# Patient Record
Sex: MALE | Race: WHITE
[De-identification: names, ages, dates, MRNs, and addresses within clinical notes are randomized per-mention and may not be internally consistent; named-entity substitution may affect disease eponyms.]

---

## 2017-03-16 ENCOUNTER — HOSPITAL ENCOUNTER (INPATIENT)
Dept: HOSPITAL 61 - 4 NORTH | Age: 72
LOS: 5 days | Discharge: HOME HEALTH | DRG: 580 | End: 2017-03-21
Attending: FAMILY MEDICINE | Admitting: FAMILY MEDICINE
Payer: MEDICARE

## 2017-03-16 VITALS — WEIGHT: 302.13 LBS | HEIGHT: 70 IN | BODY MASS INDEX: 43.25 KG/M2

## 2017-03-16 VITALS — DIASTOLIC BLOOD PRESSURE: 81 MMHG | SYSTOLIC BLOOD PRESSURE: 152 MMHG

## 2017-03-16 VITALS — DIASTOLIC BLOOD PRESSURE: 63 MMHG | SYSTOLIC BLOOD PRESSURE: 121 MMHG

## 2017-03-16 VITALS — DIASTOLIC BLOOD PRESSURE: 71 MMHG | SYSTOLIC BLOOD PRESSURE: 113 MMHG

## 2017-03-16 VITALS — SYSTOLIC BLOOD PRESSURE: 125 MMHG | DIASTOLIC BLOOD PRESSURE: 52 MMHG

## 2017-03-16 VITALS — SYSTOLIC BLOOD PRESSURE: 123 MMHG | DIASTOLIC BLOOD PRESSURE: 70 MMHG

## 2017-03-16 VITALS — DIASTOLIC BLOOD PRESSURE: 88 MMHG | SYSTOLIC BLOOD PRESSURE: 158 MMHG

## 2017-03-16 VITALS — DIASTOLIC BLOOD PRESSURE: 52 MMHG | SYSTOLIC BLOOD PRESSURE: 124 MMHG

## 2017-03-16 VITALS — SYSTOLIC BLOOD PRESSURE: 153 MMHG | DIASTOLIC BLOOD PRESSURE: 88 MMHG

## 2017-03-16 DIAGNOSIS — E11.9: ICD-10-CM

## 2017-03-16 DIAGNOSIS — E66.01: ICD-10-CM

## 2017-03-16 DIAGNOSIS — I10: ICD-10-CM

## 2017-03-16 DIAGNOSIS — L02.415: Primary | ICD-10-CM

## 2017-03-16 DIAGNOSIS — B95.62: ICD-10-CM

## 2017-03-16 LAB
ANION GAP SERPL CALC-SCNC: 8 MMOL/L (ref 6–14)
BASOPHILS # BLD AUTO: 0.1 X10^3/UL (ref 0–0.2)
BASOPHILS NFR BLD: 1 % (ref 0–3)
BUN SERPL-MCNC: 24 MG/DL (ref 8–26)
CALCIUM SERPL-MCNC: 9.2 MG/DL (ref 8.5–10.1)
CHLORIDE SERPL-SCNC: 106 MMOL/L (ref 98–107)
CO2 SERPL-SCNC: 30 MMOL/L (ref 21–32)
CREAT SERPL-MCNC: 1.2 MG/DL (ref 0.7–1.3)
EOSINOPHIL NFR BLD: 5 % (ref 0–3)
ERYTHROCYTE [DISTWIDTH] IN BLOOD BY AUTOMATED COUNT: 13.7 % (ref 11.5–14.5)
GFR SERPLBLD BASED ON 1.73 SQ M-ARVRAT: 59.9 ML/MIN
GLUCOSE SERPL-MCNC: 143 MG/DL (ref 70–99)
HCT VFR BLD CALC: 39.1 % (ref 39–53)
HGB BLD-MCNC: 12.8 G/DL (ref 13–17.5)
LYMPHOCYTES # BLD: 1.6 X10^3/UL (ref 1–4.8)
LYMPHOCYTES NFR BLD AUTO: 16 % (ref 24–48)
MCH RBC QN AUTO: 30 PG (ref 25–35)
MCHC RBC AUTO-ENTMCNC: 33 G/DL (ref 31–37)
MCV RBC AUTO: 91 FL (ref 79–100)
MONOCYTES NFR BLD: 6 % (ref 0–9)
NEUTROPHILS NFR BLD AUTO: 72 % (ref 31–73)
PLATELET # BLD AUTO: 269 X10^3/UL (ref 140–400)
POTASSIUM SERPL-SCNC: 3.9 MMOL/L (ref 3.5–5.1)
RBC # BLD AUTO: 4.32 X10^6/UL (ref 4.3–5.7)
SODIUM SERPL-SCNC: 144 MMOL/L (ref 136–145)
WBC # BLD AUTO: 10.2 X10^3/UL (ref 4–11)

## 2017-03-16 PROCEDURE — 36415 COLL VENOUS BLD VENIPUNCTURE: CPT

## 2017-03-16 PROCEDURE — 82947 ASSAY GLUCOSE BLOOD QUANT: CPT

## 2017-03-16 PROCEDURE — 87205 SMEAR GRAM STAIN: CPT

## 2017-03-16 PROCEDURE — 87040 BLOOD CULTURE FOR BACTERIA: CPT

## 2017-03-16 PROCEDURE — 0KBQ0ZZ EXCISION OF RIGHT UPPER LEG MUSCLE, OPEN APPROACH: ICD-10-PCS | Performed by: SURGERY

## 2017-03-16 PROCEDURE — 0HBHXZX EXCISION OF RIGHT UPPER LEG SKIN, EXTERNAL APPROACH, DIAGNOSTIC: ICD-10-PCS | Performed by: SURGERY

## 2017-03-16 PROCEDURE — 80202 ASSAY OF VANCOMYCIN: CPT

## 2017-03-16 PROCEDURE — 90715 TDAP VACCINE 7 YRS/> IM: CPT

## 2017-03-16 PROCEDURE — 80048 BASIC METABOLIC PNL TOTAL CA: CPT

## 2017-03-16 PROCEDURE — 87071 CULTURE AEROBIC QUANT OTHER: CPT

## 2017-03-16 PROCEDURE — 88304 TISSUE EXAM BY PATHOLOGIST: CPT

## 2017-03-16 PROCEDURE — 85027 COMPLETE CBC AUTOMATED: CPT

## 2017-03-16 PROCEDURE — 87075 CULTR BACTERIA EXCEPT BLOOD: CPT

## 2017-03-16 RX ADMIN — AMLODIPINE BESYLATE SCH MG: 5 TABLET ORAL at 16:00

## 2017-03-16 RX ADMIN — METFORMIN HYDROCHLORIDE SCH MG: 500 TABLET, FILM COATED ORAL at 20:03

## 2017-03-16 RX ADMIN — DEXTROSE, SODIUM CHLORIDE, SODIUM LACTATE, POTASSIUM CHLORIDE, AND CALCIUM CHLORIDE SCH MLS/HR: 5; .6; .31; .03; .02 INJECTION, SOLUTION INTRAVENOUS at 20:14

## 2017-03-16 RX ADMIN — LOSARTAN POTASSIUM SCH MG: 50 TABLET, FILM COATED ORAL at 16:00

## 2017-03-16 RX ADMIN — OXYCODONE HYDROCHLORIDE AND ACETAMINOPHEN SCH MG: 500 TABLET ORAL at 16:00

## 2017-03-16 RX ADMIN — HYDROCHLOROTHIAZIDE SCH MG: 25 TABLET ORAL at 16:00

## 2017-03-16 RX ADMIN — Medication SCH MCG: at 16:00

## 2017-03-16 RX ADMIN — VANCOMYCIN HYDROCHLORIDE PRN EACH: 1 INJECTION, POWDER, LYOPHILIZED, FOR SOLUTION INTRAVENOUS at 20:13

## 2017-03-16 RX ADMIN — ALLOPURINOL SCH MG: 300 TABLET ORAL at 16:00

## 2017-03-16 RX ADMIN — VANCOMYCIN HYDROCHLORIDE PRN EACH: 1 INJECTION, POWDER, LYOPHILIZED, FOR SOLUTION INTRAVENOUS at 17:48

## 2017-03-16 NOTE — PDOC
BRIEF OPERATIVE NOTE


Date:  Mar 16, 2017


Pre-Op Diagnosis


right thigh abscess


Post-Op Diagnosis


same


Procedure Performed


sharp debridement of skin, and subcutaneous tissue, down to muscle, skin biopsy


Surgeon


Den


Anesthesia Type:  General


Blood Loss


50cc


IV Fluid


500cc


Specimens Obtained


cultures, skin biopsy


Findings


abscess


Complications


none








DAT CENTENO MD Mar 16, 2017 18:16

## 2017-03-16 NOTE — HP
ADMIT DATE:  03/16/2017



CHIEF COMPLAINT:  Groin abscess.



HISTORY OF PRESENT ILLNESS:  A 71-year-old white male with known mild diabetes

and hypertension, developed pain, swelling and redness in the right upper thigh

area about 1 week prior to admission.  He was seen on 03/07/2017 with minor pain

in the area and told to follow up p.r.n.  He was seen again on 03/13/2017 with

increasing pain and was placed on doxycycline with cephalexin, but the pain has

increased and swelling has increased with small amount of drainage in the medial

area of the thigh and some decrease in the pain and pressure area.  The area of

induration and abscess was too large for outpatient local anesthetic incision

and drainage and he is admitted for treatment of the upper thigh abscess.



PAST HISTORY: 

ALLERGIES:  No drugs.



HOME MEDICATIONS:  Include metformin for diabetes with good control,

allopurinol, amlodipine, losartan, and pravastatin.



He has had no significant surgeries in the past.



SOCIAL HISTORY:  Nonsmoker, nondrinker, , retired, physically active.



FAMILY HISTORY:  Unremarkable.



REVIEW OF SYSTEMS:  No other specific complaints.  Diabetic control has been

good.  The most recent hemoglobin A1c being 7.1 and chemistry profile being

unremarkable as well.



OBJECTIVE:

ENT:  All within normal limits.

NECK:  No masses, nodes or bruits.

LUNGS:  Clear.

CARDIOVASCULAR:  Regular rate.  No tachycardia or murmur.

ABDOMEN:  Obese, soft, benign and nontender.

EXTREMITIES:  There is a large indurated, mildly fluctuant area of abscess in

the upper anterior medial right thigh just distal and inferior to the right

inguinal fold.  There is a scant amount of minimal drainage from pinpoint area

of the medial thigh and has small fluctuance present.  No adenopathy is noted

and no other joint or skin lesions are noted.  He has good distal pulses.

NEUROLOGIC:  Physiologic, nonfocal.

GENITOURINARY AND RECTAL:  Deferred.



ASSESSMENT:  Right proximal thigh abscess and otherwise well controlled

diabetic, hypertensive.



PLAN:  Continue IV vancomycin and home meds with surgical consultation for

incision and drainage under either general or epidural anesthesia because of the

extent of the wound.

 



______________________________

AMINAH THAKUR MD



DR:  MAI/kaity  JOB#:  [id number] / [id number]

DD:  03/16/2017 14:02  DT:  03/16/2017 14:33

## 2017-03-17 VITALS — SYSTOLIC BLOOD PRESSURE: 142 MMHG | DIASTOLIC BLOOD PRESSURE: 67 MMHG

## 2017-03-17 VITALS — SYSTOLIC BLOOD PRESSURE: 154 MMHG | DIASTOLIC BLOOD PRESSURE: 80 MMHG

## 2017-03-17 VITALS — SYSTOLIC BLOOD PRESSURE: 115 MMHG | DIASTOLIC BLOOD PRESSURE: 76 MMHG

## 2017-03-17 VITALS — SYSTOLIC BLOOD PRESSURE: 140 MMHG | DIASTOLIC BLOOD PRESSURE: 77 MMHG

## 2017-03-17 VITALS — DIASTOLIC BLOOD PRESSURE: 65 MMHG | SYSTOLIC BLOOD PRESSURE: 143 MMHG

## 2017-03-17 VITALS — DIASTOLIC BLOOD PRESSURE: 71 MMHG | SYSTOLIC BLOOD PRESSURE: 126 MMHG

## 2017-03-17 RX ADMIN — METFORMIN HYDROCHLORIDE SCH MG: 500 TABLET, FILM COATED ORAL at 17:29

## 2017-03-17 RX ADMIN — DEXTROSE, SODIUM CHLORIDE, SODIUM LACTATE, POTASSIUM CHLORIDE, AND CALCIUM CHLORIDE SCH MLS/HR: 5; .6; .31; .03; .02 INJECTION, SOLUTION INTRAVENOUS at 08:00

## 2017-03-17 RX ADMIN — Medication SCH MCG: at 08:26

## 2017-03-17 RX ADMIN — METFORMIN HYDROCHLORIDE SCH MG: 500 TABLET, FILM COATED ORAL at 08:25

## 2017-03-17 RX ADMIN — DEXTROSE, SODIUM CHLORIDE, SODIUM LACTATE, POTASSIUM CHLORIDE, AND CALCIUM CHLORIDE SCH MLS/HR: 5; .6; .31; .03; .02 INJECTION, SOLUTION INTRAVENOUS at 04:11

## 2017-03-17 RX ADMIN — ALLOPURINOL SCH MG: 300 TABLET ORAL at 08:26

## 2017-03-17 RX ADMIN — AMLODIPINE BESYLATE SCH MG: 5 TABLET ORAL at 08:26

## 2017-03-17 RX ADMIN — LOSARTAN POTASSIUM SCH MG: 50 TABLET, FILM COATED ORAL at 08:25

## 2017-03-17 RX ADMIN — OXYCODONE HYDROCHLORIDE AND ACETAMINOPHEN SCH MG: 500 TABLET ORAL at 08:24

## 2017-03-17 RX ADMIN — VANCOMYCIN HYDROCHLORIDE SCH MLS/HR: 1 INJECTION, POWDER, FOR SOLUTION INTRAVENOUS at 05:23

## 2017-03-17 RX ADMIN — ATORVASTATIN CALCIUM SCH MG: 10 TABLET, FILM COATED ORAL at 08:30

## 2017-03-17 RX ADMIN — HYDROCHLOROTHIAZIDE SCH MG: 25 TABLET ORAL at 08:25

## 2017-03-17 RX ADMIN — VANCOMYCIN HYDROCHLORIDE SCH MLS/HR: 1 INJECTION, POWDER, FOR SOLUTION INTRAVENOUS at 17:30

## 2017-03-17 NOTE — PDOC
Provider Note


Provider Note


vss, no temp- labs ok- cult pending , was on doxy/keflex prior to surg- can dc 

when dr jovan sullivan w/ what wound care needs to be done








AMINAH THAKUR MD Mar 17, 2017 08:15

## 2017-03-17 NOTE — OP
DATE OF SURGERY:  03/16/2017



PREOPERATIVE DIAGNOSIS:  Right thigh abscess.



POSTOPERATIVE DIAGNOSIS:  Right thigh abscess.



PROCEDURE:  Sharp debridement on the skin and subcutaneous tissue down to

muscle, skin biopsy.



SURGEON:  Dat Centeno M.D.



ANESTHESIA:  General.



ESTIMATED BLOOD LOSS:  50 mL.



IV FLUIDS:  500 mL.



INDICATIONS:  The patient is a morbidly obese male who presents with pain,

erythema, induration and drainage from the upper medial aspect of his right

thigh.  He is brought for debridement.



DESCRIPTION OF PROCEDURE:  The patient brought to the operating suite, given a

general anesthetic and placed in dorsal lithotomy position and the right thigh,

scrotum, and the perineal area were prepped and draped in usual sterile fashion.

 A 16 gauge needle was used as a seeker to identify the pus filled abscess

cavity and then an incision was made and the cavity evacuated with suction. 

Cultures were made.  A skin biopsy was harvested.  The incision was extended

along the entirety of the abscess cavity to allow adequate debridement.  Skin

and subcutaneous tissue was sharply removed down to the muscle.



The wound was then irrigated with a pulse .  Hemostasis obtained with

3-0 Vicryl stick tie and cautery.  Once hemostasis was obtained, the wound was

dressed with a piece of Surgicel and vaginal packing soaked in quarter percent

____ solution.  Sterile dressing applied.  The patient taken out of the

lithotomy position, awakened from his anesthetic and taken to the recovery room

in satisfactory condition.

 



______________________________

DAT CENTENO MD



DR:  LEV/kaity  JOB#:  [id number] / [id number]

DD:  03/17/2017 13:36  DT:  03/17/2017 17:53

## 2017-03-17 NOTE — PDOC
JOSE DEL RIO APRN 3/17/17 1006:


SURGICAL PROGRESS NOTE


Subjective


feeling better


Vital Signs





 Vital Signs








  Date Time  Temp Pulse Resp B/P Pulse Ox O2 Delivery O2 Flow Rate FiO2


 


3/17/17 08:26  87  115/76    


 


3/17/17 07:10      Room Air  


 


3/17/17 07:00 98.2  20  95   





 98.2       


 


3/16/17 18:28       10.0 








I&O











 Intake and Output 


 


 3/17/17





 07:00


 


Intake Total 3690 ml


 


Output Total 50 ml


 


Balance 3640 ml


 


 


 


Intake Oral 240 ml


 


IV Total 2325 ml


 


Other 1125 ml


 


Output Estimated Blood Loss 50 ml


 


# Voids 2








General:  Alert, Oriented X3, Cooperative, No acute distress


Extremities:  Other (right groin wound clean, packed )


Labs





Laboratory Tests








Test


  3/16/17


15:45 3/16/17


18:16 3/16/17


20:43 3/17/17


08:05


 


White Blood Count


  10.2x10^3/uL


(4.0-11.0) 


  


  


 


 


Red Blood Count


  4.32x10^6/uL


(4.30-5.70) 


  


  


 


 


Hemoglobin


  12.8g/dL


(13.0-17.5) 


  


  


 


 


Hematocrit


  39.1%


(39.0-53.0) 


  


  


 


 


Mean Corpuscular Volume 91fL ()    


 


Mean Corpuscular Hemoglobin 30pg (25-35)    


 


Mean Corpuscular Hemoglobin


Concent 33g/dL (31-37) 


  


  


  


 


 


Red Cell Distribution Width


  13.7%


(11.5-14.5) 


  


  


 


 


Platelet Count


  269x10^3/uL


(140-400) 


  


  


 


 


Neutrophils (%) (Auto) 72% (31-73)    


 


Lymphocytes (%) (Auto) 16% (24-48)    


 


Monocytes (%) (Auto) 6% (0-9)    


 


Eosinophils (%) (Auto) 5% (0-3)    


 


Basophils (%) (Auto) 1% (0-3)    


 


Neutrophils # (Auto)


  7.4x10^3uL


(1.8-7.7) 


  


  


 


 


Lymphocytes # (Auto)


  1.6x10^3/uL


(1.0-4.8) 


  


  


 


 


Monocytes # (Auto)


  0.6x10^3/uL


(0.0-1.1) 


  


  


 


 


Eosinophils # (Auto)


  0.5x10^3/uL


(0.0-0.7) 


  


  


 


 


Basophils # (Auto)


  0.1x10^3/uL


(0.0-0.2) 


  


  


 


 


Sodium Level


  144mmol/L


(136-145) 


  


  


 


 


Potassium Level


  3.9mmol/L


(3.5-5.1) 


  


  


 


 


Chloride Level


  106mmol/L


() 


  


  


 


 


Carbon Dioxide Level


  30mmol/L


(21-32) 


  


  


 


 


Anion Gap 8 (6-14)    


 


Blood Urea Nitrogen 24mg/dL (8-26)    


 


Creatinine


  1.2mg/dL


(0.7-1.3) 


  


  


 


 


Estimated GFR


(Cockcroft-Gault) 59.9 


  


  


  


 


 


Glucose Level


  143mg/dL


(70-99) 


  


  


 


 


Calcium Level


  9.2mg/dL


(8.5-10.1) 


  


  


 


 


Glucose (Fingerstick)


  


  119mg/dL


(70-99) 163mg/dL


(70-99) 133mg/dL


(70-99)








Laboratory Tests








Test


  3/16/17


15:45 3/16/17


18:16 3/16/17


20:43 3/17/17


08:05


 


White Blood Count


  10.2x10^3/uL


(4.0-11.0) 


  


  


 


 


Red Blood Count


  4.32x10^6/uL


(4.30-5.70) 


  


  


 


 


Hemoglobin


  12.8g/dL


(13.0-17.5) 


  


  


 


 


Hematocrit


  39.1%


(39.0-53.0) 


  


  


 


 


Mean Corpuscular Volume 91fL ()    


 


Mean Corpuscular Hemoglobin 30pg (25-35)    


 


Mean Corpuscular Hemoglobin


Concent 33g/dL (31-37) 


  


  


  


 


 


Red Cell Distribution Width


  13.7%


(11.5-14.5) 


  


  


 


 


Platelet Count


  269x10^3/uL


(140-400) 


  


  


 


 


Neutrophils (%) (Auto) 72% (31-73)    


 


Lymphocytes (%) (Auto) 16% (24-48)    


 


Monocytes (%) (Auto) 6% (0-9)    


 


Eosinophils (%) (Auto) 5% (0-3)    


 


Basophils (%) (Auto) 1% (0-3)    


 


Neutrophils # (Auto)


  7.4x10^3uL


(1.8-7.7) 


  


  


 


 


Lymphocytes # (Auto)


  1.6x10^3/uL


(1.0-4.8) 


  


  


 


 


Monocytes # (Auto)


  0.6x10^3/uL


(0.0-1.1) 


  


  


 


 


Eosinophils # (Auto)


  0.5x10^3/uL


(0.0-0.7) 


  


  


 


 


Basophils # (Auto)


  0.1x10^3/uL


(0.0-0.2) 


  


  


 


 


Sodium Level


  144mmol/L


(136-145) 


  


  


 


 


Potassium Level


  3.9mmol/L


(3.5-5.1) 


  


  


 


 


Chloride Level


  106mmol/L


() 


  


  


 


 


Carbon Dioxide Level


  30mmol/L


(21-32) 


  


  


 


 


Anion Gap 8 (6-14)    


 


Blood Urea Nitrogen 24mg/dL (8-26)    


 


Creatinine


  1.2mg/dL


(0.7-1.3) 


  


  


 


 


Estimated GFR


(Cockcroft-Gault) 59.9 


  


  


  


 


 


Glucose Level


  143mg/dL


(70-99) 


  


  


 


 


Calcium Level


  9.2mg/dL


(8.5-10.1) 


  


  


 


 


Glucose (Fingerstick)


  


  119mg/dL


(70-99) 163mg/dL


(70-99) 133mg/dL


(70-99)








Problem List


s/p debridement right thigh abscess





wound care to see for wound assessment, packing needs daily


Problems:  





DAT CENTENO MD 3/17/17 1338:


SURGICAL PROGRESS NOTE


Assessment/Plan


pt seen and examined


area of erythema outlined yesterday in OR has already improved


continue wound care


home when able to take PO abx


Problems:  








JOSE DEL RIO Mar 17, 2017 10:06


DAT CENTENO MD Mar 17, 2017 13:38

## 2017-03-18 VITALS — SYSTOLIC BLOOD PRESSURE: 149 MMHG | DIASTOLIC BLOOD PRESSURE: 77 MMHG

## 2017-03-18 VITALS — SYSTOLIC BLOOD PRESSURE: 144 MMHG | DIASTOLIC BLOOD PRESSURE: 74 MMHG

## 2017-03-18 VITALS — DIASTOLIC BLOOD PRESSURE: 87 MMHG | SYSTOLIC BLOOD PRESSURE: 169 MMHG

## 2017-03-18 VITALS — DIASTOLIC BLOOD PRESSURE: 72 MMHG | SYSTOLIC BLOOD PRESSURE: 153 MMHG

## 2017-03-18 VITALS — DIASTOLIC BLOOD PRESSURE: 81 MMHG | SYSTOLIC BLOOD PRESSURE: 165 MMHG

## 2017-03-18 VITALS — SYSTOLIC BLOOD PRESSURE: 166 MMHG | DIASTOLIC BLOOD PRESSURE: 79 MMHG

## 2017-03-18 RX ADMIN — METFORMIN HYDROCHLORIDE SCH MG: 500 TABLET, FILM COATED ORAL at 09:23

## 2017-03-18 RX ADMIN — ATORVASTATIN CALCIUM SCH MG: 10 TABLET, FILM COATED ORAL at 09:23

## 2017-03-18 RX ADMIN — Medication SCH MCG: at 09:23

## 2017-03-18 RX ADMIN — LOSARTAN POTASSIUM SCH MG: 50 TABLET, FILM COATED ORAL at 09:22

## 2017-03-18 RX ADMIN — OXYCODONE HYDROCHLORIDE AND ACETAMINOPHEN SCH MG: 500 TABLET ORAL at 09:22

## 2017-03-18 RX ADMIN — VANCOMYCIN HYDROCHLORIDE SCH MLS/HR: 1 INJECTION, POWDER, FOR SOLUTION INTRAVENOUS at 18:11

## 2017-03-18 RX ADMIN — HYDROCHLOROTHIAZIDE SCH MG: 25 TABLET ORAL at 09:22

## 2017-03-18 RX ADMIN — VANCOMYCIN HYDROCHLORIDE PRN EACH: 1 INJECTION, POWDER, LYOPHILIZED, FOR SOLUTION INTRAVENOUS at 06:31

## 2017-03-18 RX ADMIN — ALLOPURINOL SCH MG: 300 TABLET ORAL at 09:23

## 2017-03-18 RX ADMIN — VANCOMYCIN HYDROCHLORIDE SCH MLS/HR: 1 INJECTION, POWDER, FOR SOLUTION INTRAVENOUS at 06:29

## 2017-03-18 RX ADMIN — METFORMIN HYDROCHLORIDE SCH MG: 500 TABLET, FILM COATED ORAL at 18:10

## 2017-03-18 RX ADMIN — AMLODIPINE BESYLATE SCH MG: 5 TABLET ORAL at 09:23

## 2017-03-18 NOTE — PDOC
GENERAL


General:


vss and afebrile. sugars good. bmp and cbc ok at admit.  gram stain with only 

rbc's. surgical notes reviewed. surgery help appreciated. growth of staph 

aureus on culture. await final sensitivities. cont same.


Problems:  





VITAL SIGNS


Vital Signs:





 Vital Signs








  Date Time  Temp Pulse Resp B/P Pulse Ox O2 Delivery O2 Flow Rate FiO2


 


3/18/17 11:00 98.0 86 16 144/74 95 Room Air  





 98.0       











I & O


I & O











 Intake and Output 


 


 3/18/17





 07:00


 


Intake Total 720 ml


 


Balance 720 ml


 


 


 


Intake Oral 720 ml


 


# Voids 2











ALLERGIES


Allergies:





 Allergies








Coded Allergies Type Severity Reaction Last Updated Verified


 


  No Known Drug Allergies    3/16/17 No











MEDS


Medications:





 Current Medications








 Medications


  (Trade)  Dose


 Ordered  Sig/Fanny  Start Time


 Stop Time Status Last Admin


Dose Admin


 


 Allopurinol


  (Zyloprim)  300 mg  DAILY  3/16/17 16:00


    3/18/17 09:23


300 MG


 


 Amlodipine


 Besylate


  (Norvasc)  2.5 mg  DAILY  3/16/17 16:00


    3/18/17 09:23


2.5 MG


 


 Ascorbic Acid


  (Vitamin C)  500 mg  DAILY  3/16/17 16:00


    3/18/17 09:22


500 MG


 


 Atorvastatin


 Calcium


  (Lipitor)  5 mg  DAILY  3/17/17 09:00


    3/18/17 09:23


5 MG


 


 Atorvastatin


 Calcium 5 mg  5 mg  QHS  3/16/17 21:00


 3/16/17 21:16 DC  


 


 


 Cellulose  1 each  STK-MED ONCE  3/16/17 17:54


 3/16/17 17:55 DC 3/16/17 17:42


1 EACH


 


 Cyanocobalamin


  (Vitamin B-12)  1,000 mcg  DAILY  3/16/17 16:00


    3/18/17 09:23


1,000 MCG


 


 Dextrose/Lactated


 Ringer's


  (Iv D5%-Lr)  1,000 ml @ 


 125 mls/hr  Q8H  3/16/17 16:00


 3/17/17 08:12 DC 3/17/17 04:11


125 MLS/HR


 


 Diphenhydramine


 HCl 25 mg  25 mg  1X PACU  PRN  3/16/17 19:00


 3/18/17 13:02 DC 3/16/17 18:52


25 MG


 


 Diphtheria/


 Tetanus/Acell


 Pertussis


  (Boostrix)  0.5 ml  ONCE ONCE  3/16/17 21:00


 3/16/17 21:01 DC 3/16/17 21:11


0.5 ML


 


 Fentanyl Citrate


  (Fentanyl 2ml


 Vial)  100 mcg  STK-MED ONCE  3/16/17 17:04


 3/16/17 17:05 DC  


 


 


 Hydrochlorothiazide


  (Hydrodiuril)  25 mg  DAILY  3/16/17 16:00


    3/18/17 09:22


25 MG


 


 Hydromorphone HCl


  (Dilaudid)  1 mg  PRN Q3HRS  PRN  3/16/17 18:30


    3/17/17 16:23


1 MG


 


 Lactated Ringer's


  (Iv Lactated


 Ringers)  1,000 ml @ 


 30 mls/hr  Q24H  3/16/17 16:18


 3/17/17 04:17 DC 3/16/17 16:56


30 MLS/HR


 


 Lidocaine HCl  100 mg  STK-MED ONCE  3/16/17 18:00


 3/16/17 18:01 DC  


 


 


 Losartan Potassium


  (Cozaar)  100 mg  DAILY  3/16/17 16:00


    3/18/17 09:22


100 MG


 


 Metformin HCl


  (Glucophage)  1,000 mg  BIDWMEALS  3/16/17 17:00


    3/18/17 09:23


1,000 MG


 


 Morphine Sulfate


 1 mg  1 mg  PRN Q10MIN  PRN  3/16/17 16:30


 3/16/17 22:00 DC  


 


 


 Ondansetron HCl


  (Zofran)  4 mg  PRN Q6HRS  PRN  3/16/17 18:30


     


 


 


 Oxycodone/


 Acetaminophen


  (Percocet 10/325)  1 tab  PRN Q4HRS  PRN  3/16/17 18:30


     


 


 


 Prochlorperazine


 Edisylate


  (Compazine)  5 mg  PACU PRN  PRN  3/16/17 16:30


 3/16/17 22:00 DC  


 


 


 Propofol


  (Diprivan)  20 ml @ As


 Directed  STK-MED ONCE  3/16/17 17:04


 3/16/17 17:05 DC  


 


 


 Sevoflurane 30 ml  30 ml  STK-MED ONCE  3/16/17 17:04


 3/16/17 17:05 DC  


 


 


 Sodium


 Hypochlorite


  (Dakin'S 1/4


 Strength)  1 jesus  ONCE  ONCE  3/16/17 18:00


 3/16/17 18:01 DC 3/16/17 17:42


1 JESUS


 


 Vancomycin HCl  1 each  1X  ONCE  3/18/17 05:00


 3/18/17 05:01 DC 3/18/17 05:00


1 EACH


 


 Vancomycin HCl


  (Vanco Per


 Pharmacy)  1 each  PRN DAILY  PRN  3/16/17 15:45


    3/18/17 06:31


1 EACH


 


 Vancomycin HCl/


 Sodium Chloride


  (Iv Sodium


 Chloride 0.9%


 500ml Bag)  500 ml @ 


 250 mls/hr  Q12H  3/17/17 05:30


    3/18/17 06:29


250 MLS/HR











LAB


Lab:





Laboratory Tests








Test


  3/17/17


16:55 3/17/17


20:43 3/18/17


04:45 3/18/17


07:34


 


Glucose (Fingerstick)


  150mg/dL


(70-99) 124mg/dL


(70-99) 


  149mg/dL


(70-99)


 


Vancomycin Level Trough


  


  


  17.7mcg/mL


(10.0-20.0) 


 


 


Vancomycin Last Dose Date   3//17/17  


 


Vancomycin Last Dose Time   1830  














Test


  3/18/17


10:54 


  


  


 


 


Glucose (Fingerstick)


  156mg/dL


(70-99) 


  


  


 














LEIGH ANN UMANA MD Mar 18, 2017 15:46

## 2017-03-18 NOTE — PDOC
Provider Note


Provider Note


POD 2


no c/o


wound vac on


 no new recs








DAT CENTENO MD Mar 18, 2017 10:21

## 2017-03-19 VITALS — SYSTOLIC BLOOD PRESSURE: 158 MMHG | DIASTOLIC BLOOD PRESSURE: 84 MMHG

## 2017-03-19 VITALS — SYSTOLIC BLOOD PRESSURE: 146 MMHG | DIASTOLIC BLOOD PRESSURE: 77 MMHG

## 2017-03-19 VITALS — SYSTOLIC BLOOD PRESSURE: 147 MMHG | DIASTOLIC BLOOD PRESSURE: 89 MMHG

## 2017-03-19 VITALS — SYSTOLIC BLOOD PRESSURE: 152 MMHG | DIASTOLIC BLOOD PRESSURE: 87 MMHG

## 2017-03-19 VITALS — SYSTOLIC BLOOD PRESSURE: 145 MMHG | DIASTOLIC BLOOD PRESSURE: 77 MMHG

## 2017-03-19 VITALS — DIASTOLIC BLOOD PRESSURE: 81 MMHG | SYSTOLIC BLOOD PRESSURE: 163 MMHG

## 2017-03-19 RX ADMIN — METFORMIN HYDROCHLORIDE SCH MG: 500 TABLET, FILM COATED ORAL at 16:52

## 2017-03-19 RX ADMIN — ALLOPURINOL SCH MG: 300 TABLET ORAL at 08:13

## 2017-03-19 RX ADMIN — Medication SCH MCG: at 08:13

## 2017-03-19 RX ADMIN — SULFAMETHOXAZOLE AND TRIMETHOPRIM SCH TAB: 800; 160 TABLET ORAL at 20:32

## 2017-03-19 RX ADMIN — HYDROCHLOROTHIAZIDE SCH MG: 25 TABLET ORAL at 08:13

## 2017-03-19 RX ADMIN — OXYCODONE HYDROCHLORIDE AND ACETAMINOPHEN SCH MG: 500 TABLET ORAL at 08:13

## 2017-03-19 RX ADMIN — METFORMIN HYDROCHLORIDE SCH MG: 500 TABLET, FILM COATED ORAL at 08:13

## 2017-03-19 RX ADMIN — AMLODIPINE BESYLATE SCH MG: 5 TABLET ORAL at 08:14

## 2017-03-19 RX ADMIN — ATORVASTATIN CALCIUM SCH MG: 10 TABLET, FILM COATED ORAL at 08:14

## 2017-03-19 RX ADMIN — VANCOMYCIN HYDROCHLORIDE SCH MLS/HR: 1 INJECTION, POWDER, FOR SOLUTION INTRAVENOUS at 05:11

## 2017-03-19 RX ADMIN — LOSARTAN POTASSIUM SCH MG: 50 TABLET, FILM COATED ORAL at 08:13

## 2017-03-19 NOTE — PDOC
Provider Note


Provider Note


SURG   POD 3





no new c/o


vac in place


continue wound care








DAT CENTENO MD Mar 19, 2017 11:32

## 2017-03-19 NOTE — PDOC
GENERAL


General:


vss and afebrile. leg feeling better other than discomfort of wound vac. mrsa 

on cultures and will transition patient to po bactrim. will need home health 

wound care at [place].


Problems:  





VITAL SIGNS


Vital Signs:





 Vital Signs








  Date Time  Temp Pulse Resp B/P Pulse Ox O2 Delivery O2 Flow Rate FiO2


 


3/19/17 11:00 98.1 83 18 147/89 97 Room Air  





 98.1       











I & O


I & O











 Intake and Output 


 


 3/19/17





 07:00


 


Intake Total 240 ml


 


Balance 240 ml


 


 


 


Intake Oral 240 ml


 


# Voids 3











ALLERGIES


Allergies:





 Allergies








Coded Allergies Type Severity Reaction Last Updated Verified


 


  No Known Drug Allergies    3/16/17 No











MEDS


Medications:





 Current Medications








 Medications


  (Trade)  Dose


 Ordered  Sig/Fanny  Start Time


 Stop Time Status Last Admin


Dose Admin


 


 Allopurinol


  (Zyloprim)  300 mg  DAILY  3/16/17 16:00


    3/19/17 08:13


300 MG


 


 Amlodipine


 Besylate


  (Norvasc)  2.5 mg  DAILY  3/16/17 16:00


    3/19/17 08:14


2.5 MG


 


 Ascorbic Acid


  (Vitamin C)  500 mg  DAILY  3/16/17 16:00


    3/19/17 08:13


500 MG


 


 Atorvastatin


 Calcium


  (Lipitor)  5 mg  DAILY  3/17/17 09:00


    3/19/17 08:14


5 MG


 


 Atorvastatin


 Calcium 5 mg  5 mg  QHS  3/16/17 21:00


 3/16/17 21:16 DC  


 


 


 Cellulose  1 each  STK-MED ONCE  3/16/17 17:54


 3/16/17 17:55 DC 3/16/17 17:42


1 EACH


 


 Cyanocobalamin


  (Vitamin B-12)  1,000 mcg  DAILY  3/16/17 16:00


    3/19/17 08:13


1,000 MCG


 


 Dextrose/Lactated


 Ringer's


  (Iv D5%-Lr)  1,000 ml @ 


 125 mls/hr  Q8H  3/16/17 16:00


 3/17/17 08:12 DC 3/17/17 04:11


125 MLS/HR


 


 Diphenhydramine


 HCl 25 mg  25 mg  1X PACU  PRN  3/16/17 19:00


 3/18/17 13:02 DC 3/16/17 18:52


25 MG


 


 Diphtheria/


 Tetanus/Acell


 Pertussis


  (Boostrix)  0.5 ml  ONCE ONCE  3/16/17 21:00


 3/16/17 21:01 DC 3/16/17 21:11


0.5 ML


 


 Fentanyl Citrate


  (Fentanyl 2ml


 Vial)  100 mcg  STK-MED ONCE  3/16/17 17:04


 3/16/17 17:05 DC  


 


 


 Hydrochlorothiazide


  (Hydrodiuril)  25 mg  DAILY  3/16/17 16:00


    3/19/17 08:13


25 MG


 


 Hydromorphone HCl


  (Dilaudid)  1 mg  PRN Q3HRS  PRN  3/16/17 18:30


    3/17/17 16:23


1 MG


 


 Lactated Ringer's


  (Iv Lactated


 Ringers)  1,000 ml @ 


 30 mls/hr  Q24H  3/16/17 16:18


 3/17/17 04:17 DC 3/16/17 16:56


30 MLS/HR


 


 Lidocaine HCl  100 mg  STK-MED ONCE  3/16/17 18:00


 3/16/17 18:01 DC  


 


 


 Losartan Potassium


  (Cozaar)  100 mg  DAILY  3/16/17 16:00


    3/19/17 08:13


100 MG


 


 Metformin HCl


  (Glucophage)  1,000 mg  BIDWMEALS  3/16/17 17:00


    3/19/17 08:13


1,000 MG


 


 Morphine Sulfate


 1 mg  1 mg  PRN Q10MIN  PRN  3/16/17 16:30


 3/16/17 22:00 DC  


 


 


 Ondansetron HCl


  (Zofran)  4 mg  PRN Q6HRS  PRN  3/16/17 18:30


     


 


 


 Oxycodone/


 Acetaminophen


  (Percocet 10/325)  1 tab  PRN Q4HRS  PRN  3/16/17 18:30


     


 


 


 Prochlorperazine


 Edisylate


  (Compazine)  5 mg  PACU PRN  PRN  3/16/17 16:30


 3/16/17 22:00 DC  


 


 


 Propofol


  (Diprivan)  20 ml @ As


 Directed  STK-MED ONCE  3/16/17 17:04


 3/16/17 17:05 DC  


 


 


 Sevoflurane 30 ml  30 ml  STK-MED ONCE  3/16/17 17:04


 3/16/17 17:05 DC  


 


 


 Sodium


 Hypochlorite


  (Dakin'S 1/4


 Strength)  1 jesus  ONCE  ONCE  3/16/17 18:00


 3/16/17 18:01 DC 3/16/17 17:42


1 JESUS


 


 Vancomycin HCl  1 each  1X  ONCE  3/18/17 05:00


 3/18/17 05:01 DC 3/18/17 05:00


1 EACH


 


 Vancomycin HCl


  (Vanco Per


 Pharmacy)  1 each  PRN DAILY  PRN  3/16/17 15:45


    3/18/17 06:31


1 EACH


 


 Vancomycin HCl/


 Sodium Chloride


  (Iv Sodium


 Chloride 0.9%


 500ml Bag)  500 ml @ 


 250 mls/hr  Q12H  3/17/17 05:30


    3/19/17 05:11


250 MLS/HR











LAB


Lab:





Laboratory Tests








Test


  3/18/17


16:17 3/18/17


20:49 3/19/17


07:11 3/19/17


11:06


 


Glucose (Fingerstick)


  126mg/dL


(70-99) 118mg/dL


(70-99) 131mg/dL


(70-99) 153mg/dL


(70-99)














LEIGH ANN UMANA MD Mar 19, 2017 11:20

## 2017-03-20 VITALS — DIASTOLIC BLOOD PRESSURE: 79 MMHG | SYSTOLIC BLOOD PRESSURE: 154 MMHG

## 2017-03-20 VITALS — SYSTOLIC BLOOD PRESSURE: 119 MMHG | DIASTOLIC BLOOD PRESSURE: 69 MMHG

## 2017-03-20 VITALS — SYSTOLIC BLOOD PRESSURE: 134 MMHG | DIASTOLIC BLOOD PRESSURE: 79 MMHG

## 2017-03-20 VITALS — DIASTOLIC BLOOD PRESSURE: 87 MMHG | SYSTOLIC BLOOD PRESSURE: 147 MMHG

## 2017-03-20 VITALS — SYSTOLIC BLOOD PRESSURE: 159 MMHG | DIASTOLIC BLOOD PRESSURE: 101 MMHG

## 2017-03-20 VITALS — DIASTOLIC BLOOD PRESSURE: 76 MMHG | SYSTOLIC BLOOD PRESSURE: 150 MMHG

## 2017-03-20 RX ADMIN — Medication SCH MCG: at 08:45

## 2017-03-20 RX ADMIN — LOSARTAN POTASSIUM SCH MG: 50 TABLET, FILM COATED ORAL at 08:44

## 2017-03-20 RX ADMIN — METFORMIN HYDROCHLORIDE SCH MG: 500 TABLET, FILM COATED ORAL at 08:43

## 2017-03-20 RX ADMIN — AMLODIPINE BESYLATE SCH MG: 5 TABLET ORAL at 08:45

## 2017-03-20 RX ADMIN — OXYCODONE HYDROCHLORIDE AND ACETAMINOPHEN SCH MG: 500 TABLET ORAL at 08:43

## 2017-03-20 RX ADMIN — METFORMIN HYDROCHLORIDE SCH MG: 500 TABLET, FILM COATED ORAL at 17:05

## 2017-03-20 RX ADMIN — SULFAMETHOXAZOLE AND TRIMETHOPRIM SCH TAB: 800; 160 TABLET ORAL at 20:59

## 2017-03-20 RX ADMIN — HYDROCHLOROTHIAZIDE SCH MG: 25 TABLET ORAL at 08:43

## 2017-03-20 RX ADMIN — ATORVASTATIN CALCIUM SCH MG: 10 TABLET, FILM COATED ORAL at 08:45

## 2017-03-20 RX ADMIN — SULFAMETHOXAZOLE AND TRIMETHOPRIM SCH TAB: 800; 160 TABLET ORAL at 08:43

## 2017-03-20 RX ADMIN — ALLOPURINOL SCH MG: 300 TABLET ORAL at 08:43

## 2017-03-20 NOTE — DISCH
DISCHARGE


CONDITION ON DISCHARGE:  Stable


HOME HEALTH:  Yes


PT. HAS FUNCTIONAL LIMITATIONS:  Yes


FACE TO FACE ENCOUNTER:  Yes


POST DISCHARGE ORDERS


ACTIVITY ORDERS:  No restrictions


DIET AFTER DISCHARGE:  Regular





FOLLOW-UP


PHYSICIAN FOLLOW-UP:  per AMINAH Diaz MD Mar 20, 2017 08:41

## 2017-03-20 NOTE — PDOC
JOSE DEL RIO APRN 3/20/17 0913:


SURGICAL PROGRESS NOTE


Subjective


doing well


no complaints


Vital Signs





 Vital Signs








  Date Time  Temp Pulse Resp B/P Pulse Ox O2 Delivery O2 Flow Rate FiO2


 


3/20/17 08:45  76  147/87    


 


3/20/17 07:00 97.7  18  95 Room Air  





 97.7       








I&O











 Intake and Output 


 


 3/20/17





 07:00


 


Intake Total 120 ml


 


Balance 120 ml


 


 


 


Intake Oral 120 ml


 


# Voids 7


 


# Bowel Movements 2








General:  Alert, Oriented X3, Cooperative, No acute distress


Extremities:  Other (wound vac to groin)


Labs





Laboratory Tests








Test


  3/18/17


10:54 3/18/17


16:17 3/18/17


20:49 3/19/17


07:11


 


Glucose (Fingerstick)


  156mg/dL


(70-99) 126mg/dL


(70-99) 118mg/dL


(70-99) 131mg/dL


(70-99)














Test


  3/19/17


11:06 3/19/17


16:06 3/19/17


20:29 3/20/17


07:29


 


Glucose (Fingerstick)


  153mg/dL


(70-99) 120mg/dL


(70-99) 151mg/dL


(70-99) 118mg/dL


(70-99)








Laboratory Tests








Test


  3/19/17


11:06 3/19/17


16:06 3/19/17


20:29 3/20/17


07:29


 


Glucose (Fingerstick)


  153mg/dL


(70-99) 120mg/dL


(70-99) 151mg/dL


(70-99) 118mg/dL


(70-99)








Problem List


wound vac


waiting for home vac arrangements


Problems:  





DAT CENTENO MD 3/20/17 1539:


SURGICAL PROGRESS NOTE


Assessment/Plan


agree with above


Problems:  








JOSE DEL RIO Mar 20, 2017 09:13


DAT CENTENO MD Mar 20, 2017 15:39

## 2017-03-20 NOTE — DS
DATE OF DISCHARGE:  03/20/2017



HOSPITAL SUMMARY:  The patient came in with a large right proximal and medial

thigh abscess which Dr. Crenshaw took to surgery with the incision and drainage

performed.  CBC and chemistry profile were unremarkable, and wound culture grew

out MRSA, sensitive to Bactrim and tetracycline.  Blood cultures had no growth. 

He was treated with IV vancomycin followed by oral Bactrim, and wound VAC has

been applied, and he is comfortable to be followed as an outpatient as soon as

home health could be arranged with the wound VAC.



FINAL DIAGNOSIS:  Right inguinal abscess secondary to Methicillin-resistant

Staphylococcus aureus.



OPERATIONS AND PROCEDURES:  Incision and drainage.



COMPLICATIONS:  None.



CONSULTATIONS:  Dr. Crenshaw.



DISPOSITION:  Bactrim DS one tablet twice a day for 5 more days.  Rest of home

medications remain the same.  Ibuprofen as needed for pain.  Activity as

tolerated.  Wound VAC in place and follow up with Dr. Crenshaw in 1 week, and I

will see on a p.r.n. basis.

 



______________________________

AMINAH THAKUR MD



DR:  MAI/nts  JOB#:  [id number] / [id number]

DD:  03/20/2017 08:44  DT:  03/20/2017 11:41

## 2017-03-20 NOTE — PATHOLOGY
PATHOLOGY REPORT 

 

*    *    *    *    *    *    *    * 

 FINAL DIAGNOSIS:

Skin and subcutaneous tissue, right thigh:

- Abscess.

 

COMMENT:

There is no evidence of malignancy. 

(JPM:csd; d/t: 2017)

 

 

REPORT ELECTRONICALLY SIGNED BY:   Tony Childress M.D.

DATE/TIME:   3/20/2017 13:21

*    *    *    *    *    *    *    *

 

GROSS PATHOLOGY:

The specimen is received in formalin labeled "Jaime Rodriguez,

right thigh skin and subcutaneous tissue".  Received is an ellipse of

dusky gray-tan skin with attached white-tan fibroadipose tissue

measuring 4.5 x 2.6 x 3.4 cm in greatest dimensions.  Sectioning

reveals a yellow-tan to pink-gray soft cut surface. The specimen is

submitted representatively in cassette A1.

([facility]AA; 3/17/2017)

 

 

 INITIAL CPT CODE(S):

A; 21805

Professional services performed by [facility] [place] 

[place] [address]

 

  Technical services performed by [facility] at [address] [place] [address].

  

 SPECIMEN(S) RECEIVED:

A.Right thigh skin and subcutaneous tissue

 

 CLINICAL HISTORY:

Right thigh abscess

 

 PATIENT:  JAIME RODRIGUEZ

/AGE:  1945 (Age: 71)  

PATIENT #:  [id number]

ACCESSION #:  [id number]          ALT CASE #:   

SPECIMEN COLLECTION DATE:  3/16/2017

SPECIMEN RECEIVED DATE:  3/17/2017

   

[facility] - [address] [place] [address] - PHONE: 

[phone]

* * *  END OF REPORT  * * *

## 2017-03-21 VITALS
SYSTOLIC BLOOD PRESSURE: 153 MMHG | SYSTOLIC BLOOD PRESSURE: 153 MMHG | DIASTOLIC BLOOD PRESSURE: 76 MMHG | DIASTOLIC BLOOD PRESSURE: 76 MMHG | DIASTOLIC BLOOD PRESSURE: 76 MMHG | DIASTOLIC BLOOD PRESSURE: 76 MMHG | SYSTOLIC BLOOD PRESSURE: 153 MMHG | SYSTOLIC BLOOD PRESSURE: 153 MMHG | SYSTOLIC BLOOD PRESSURE: 153 MMHG | SYSTOLIC BLOOD PRESSURE: 153 MMHG | DIASTOLIC BLOOD PRESSURE: 76 MMHG | DIASTOLIC BLOOD PRESSURE: 76 MMHG | SYSTOLIC BLOOD PRESSURE: 153 MMHG | SYSTOLIC BLOOD PRESSURE: 153 MMHG | DIASTOLIC BLOOD PRESSURE: 76 MMHG | DIASTOLIC BLOOD PRESSURE: 76 MMHG | SYSTOLIC BLOOD PRESSURE: 153 MMHG | DIASTOLIC BLOOD PRESSURE: 76 MMHG | SYSTOLIC BLOOD PRESSURE: 153 MMHG | DIASTOLIC BLOOD PRESSURE: 76 MMHG | DIASTOLIC BLOOD PRESSURE: 76 MMHG | DIASTOLIC BLOOD PRESSURE: 76 MMHG | SYSTOLIC BLOOD PRESSURE: 153 MMHG | SYSTOLIC BLOOD PRESSURE: 153 MMHG | SYSTOLIC BLOOD PRESSURE: 153 MMHG | DIASTOLIC BLOOD PRESSURE: 76 MMHG | DIASTOLIC BLOOD PRESSURE: 76 MMHG | SYSTOLIC BLOOD PRESSURE: 153 MMHG | SYSTOLIC BLOOD PRESSURE: 153 MMHG | SYSTOLIC BLOOD PRESSURE: 153 MMHG | DIASTOLIC BLOOD PRESSURE: 76 MMHG | DIASTOLIC BLOOD PRESSURE: 76 MMHG

## 2017-03-21 VITALS — DIASTOLIC BLOOD PRESSURE: 73 MMHG | SYSTOLIC BLOOD PRESSURE: 139 MMHG

## 2017-03-21 VITALS — DIASTOLIC BLOOD PRESSURE: 72 MMHG | SYSTOLIC BLOOD PRESSURE: 129 MMHG

## 2017-03-21 RX ADMIN — OXYCODONE HYDROCHLORIDE AND ACETAMINOPHEN SCH MG: 500 TABLET ORAL at 09:05

## 2017-03-21 RX ADMIN — LOSARTAN POTASSIUM SCH MG: 50 TABLET, FILM COATED ORAL at 09:06

## 2017-03-21 RX ADMIN — METFORMIN HYDROCHLORIDE SCH MG: 500 TABLET, FILM COATED ORAL at 09:06

## 2017-03-21 RX ADMIN — ATORVASTATIN CALCIUM SCH MG: 10 TABLET, FILM COATED ORAL at 09:06

## 2017-03-21 RX ADMIN — AMLODIPINE BESYLATE SCH MG: 5 TABLET ORAL at 09:08

## 2017-03-21 RX ADMIN — SULFAMETHOXAZOLE AND TRIMETHOPRIM SCH TAB: 800; 160 TABLET ORAL at 09:08

## 2017-03-21 RX ADMIN — Medication SCH MCG: at 09:05

## 2017-03-21 RX ADMIN — HYDROCHLOROTHIAZIDE SCH MG: 25 TABLET ORAL at 09:06

## 2017-03-21 RX ADMIN — ALLOPURINOL SCH MG: 300 TABLET ORAL at 09:08

## 2017-03-21 NOTE — PDOC
JOSE DEL RIO APRN 3/21/17 1134:


SURGICAL PROGRESS NOTE


Subjective


tolerating diet


wound vac arranged


Vital Signs





 Vital Signs








  Date Time  Temp Pulse Resp B/P Pulse Ox O2 Delivery O2 Flow Rate FiO2


 


3/21/17 11:00 98.1 77 20 153/76 94 Room Air  





 98.1       








I&O











 Intake and Output 


 


 3/21/17





 07:00


 


Intake Total 630 ml


 


Output Total 500 ml


 


Balance 130 ml


 


 


 


Intake Oral 630 ml


 


Output Urine Total 500 ml


 


# Voids 8








General:  Alert, Oriented X3, Cooperative, No acute distress


Extremities:  Other (right groin wound vac in place)


Labs





Laboratory Tests








Test


  3/19/17


16:06 3/19/17


20:29 3/20/17


07:29 3/20/17


11:18


 


Glucose (Fingerstick)


  120mg/dL


(70-99) 151mg/dL


(70-99) 118mg/dL


(70-99) 132mg/dL


(70-99)














Test


  3/20/17


16:43 3/20/17


20:50 3/21/17


07:42 


 


 


Glucose (Fingerstick)


  113mg/dL


(70-99) 119mg/dL


(70-99) 119mg/dL


(70-99) 


 








Laboratory Tests








Test


  3/20/17


16:43 3/20/17


20:50 3/21/17


07:42


 


Glucose (Fingerstick)


  113mg/dL


(70-99) 119mg/dL


(70-99) 119mg/dL


(70-99)








Problem List


continue vac


will fu in wound clinic with Dr Centeno


Problems:  





DAT CENTENO MD 3/21/17 1305:


SURGICAL PROGRESS NOTE


Assessment/Plan


pt seen and examined


agree with plan


will f/u in the wound clinic


Problems:  








JOSE DEL RIO Mar 21, 2017 11:34


DAT CENTENO MD Mar 21, 2017 13:05

## 2017-03-29 ENCOUNTER — HOSPITAL ENCOUNTER (OUTPATIENT)
Dept: HOSPITAL 61 - PMGWOUND | Age: 72
Discharge: HOME | End: 2017-03-29
Attending: SURGERY
Payer: MEDICARE

## 2017-03-29 DIAGNOSIS — T81.89XD: Primary | ICD-10-CM

## 2017-03-29 DIAGNOSIS — L03.115: ICD-10-CM
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
## 2017-03-29 DIAGNOSIS — Z72.89: ICD-10-CM

## 2017-03-29 DIAGNOSIS — Z87.891: ICD-10-CM

## 2017-03-29 DIAGNOSIS — L02.214: ICD-10-CM

## 2017-03-29 PROCEDURE — 11042 DBRDMT SUBQ TIS 1ST 20SQCM/<: CPT

## 2017-03-29 PROCEDURE — 97605 NEG PRS WND THER DME<=50SQCM: CPT

## 2017-04-05 ENCOUNTER — HOSPITAL ENCOUNTER (OUTPATIENT)
Dept: HOSPITAL 61 - PMGWOUND | Age: 72
Discharge: HOME | End: 2017-04-05
Attending: SURGERY
Payer: MEDICARE

## 2017-04-05 DIAGNOSIS — T81.89XD: Primary | ICD-10-CM
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
## 2017-04-05 DIAGNOSIS — L03.115: ICD-10-CM

## 2017-04-05 DIAGNOSIS — Z87.891: ICD-10-CM

## 2017-04-05 DIAGNOSIS — L02.214: ICD-10-CM

## 2017-04-05 PROCEDURE — 99214 OFFICE O/P EST MOD 30 MIN: CPT

## 2017-04-12 ENCOUNTER — HOSPITAL ENCOUNTER (OUTPATIENT)
Dept: HOSPITAL 61 - PMGWOUND | Age: 72
Discharge: HOME | End: 2017-04-12
Attending: SURGERY
Payer: MEDICARE

## 2017-04-12 DIAGNOSIS — Z72.89: ICD-10-CM
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
## 2017-04-12 DIAGNOSIS — T81.89XD: Primary | ICD-10-CM

## 2017-04-12 DIAGNOSIS — L03.115: ICD-10-CM

## 2017-04-12 DIAGNOSIS — L02.214: ICD-10-CM

## 2017-04-12 DIAGNOSIS — Z87.891: ICD-10-CM

## 2017-04-12 PROCEDURE — 17250 CHEM CAUT OF GRANLTJ TISSUE: CPT

## 2017-04-26 ENCOUNTER — HOSPITAL ENCOUNTER (OUTPATIENT)
Dept: HOSPITAL 61 - PMGWOUND | Age: 72
Discharge: HOME | End: 2017-04-26
Attending: SURGERY
Payer: MEDICARE

## 2017-04-26 DIAGNOSIS — T81.89XD: Primary | ICD-10-CM

## 2017-04-26 DIAGNOSIS — Z72.89: ICD-10-CM

## 2017-04-26 DIAGNOSIS — Z87.891: ICD-10-CM
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
## 2017-04-26 DIAGNOSIS — L03.115: ICD-10-CM

## 2017-04-26 PROCEDURE — 99214 OFFICE O/P EST MOD 30 MIN: CPT

## 2017-11-20 ENCOUNTER — HOSPITAL ENCOUNTER (OUTPATIENT)
Dept: HOSPITAL 61 - KCIC CT | Age: 72
Discharge: HOME | End: 2017-11-20
Attending: INTERNAL MEDICINE
Payer: MEDICARE

## 2017-11-20 DIAGNOSIS — R91.8: Primary | ICD-10-CM

## 2017-11-20 DIAGNOSIS — Z87.891: ICD-10-CM

## 2017-11-20 DIAGNOSIS — J45.909: ICD-10-CM

## 2017-11-20 PROCEDURE — 71250 CT THORAX DX C-: CPT

## 2017-11-20 NOTE — [facility]
CT chest without contrast

 

History: Lung nodule follow-up. Asthma. 30 year smoking history..

 

Technique: No intravenous contrast per request. Multiplanar reformatted 

images were obtained.

 

Comparison: February 7, 2017.

 

Exposure: One or more of the following individualized dose reduction 

techniques were utilized for this examination:  1. Automated exposure 

control  2. Adjustment of the mA and/or kV according to patient size  3. 

Use of iterative reconstruction technique.

 

Findings:

Vascular structures: Limited exam without contrast.No evidence of thoracic

aortic aneurysm.

 

Lymph nodes: Small axillary lymph nodes are stable since prior. Small 

mediastinal lymph nodes are again identified and have not changed since 

prior.

Thyroid gland:Visualized aspect is unremarkable.

Heart: Mild coronary calcification.

Pleural spaces: No significant effusion

 

Lungs: Multiple pulmonary nodules in the right lung are unchanged since 

prior study. There is a small nodule in the posterior aspect of the left 

upper lobe, abutting the major fissure measures 3 mm, and measures subtly 

larger than on the prior study. This difference could just be due to a 

difference in slice location however. Other small left pulmonary nodules 

are stable. No new dominant mass is identified.

 

Trachea and central airways: Patent

 

Bones: No destructive process

 

Upper abdomen: Slices obtained through the upper most abdomen are limited 

by the noncontrast technique. There is a partially visualized lobulated 

lesion arising from the left kidney, appears roughly similar to the prior 

examination.

 

Impression:

1. Tiny 3 mm pulmonary nodule in the left upper lobe may be minimally 

larger, but this difference is subtle. Other multiple bilateral pulmonary 

nodules are stable.

2. No evidence of acute abnormality.

3. The lesion arising from the left kidney is only partially seen. If this

has not been worked up recently, ultrasound could further evaluate.

 

Electronically signed by: Jose Maria Burns MD (11/20/2017 12:26 PM) 

[facility]

## 2018-02-20 ENCOUNTER — HOSPITAL ENCOUNTER (OUTPATIENT)
Dept: HOSPITAL 61 - KCIC | Age: 73
Discharge: HOME | End: 2018-02-20
Attending: PHYSICIAN ASSISTANT
Payer: MEDICARE

## 2018-02-20 DIAGNOSIS — R06.02: ICD-10-CM

## 2018-02-20 DIAGNOSIS — M47.9: ICD-10-CM

## 2018-02-20 DIAGNOSIS — R06.2: ICD-10-CM

## 2018-02-20 DIAGNOSIS — R05: Primary | ICD-10-CM

## 2018-02-20 PROCEDURE — 71046 X-RAY EXAM CHEST 2 VIEWS: CPT

## 2019-04-01 ENCOUNTER — HOSPITAL ENCOUNTER (OUTPATIENT)
Dept: HOSPITAL 61 - ECHO | Age: 74
Discharge: HOME | End: 2019-04-01
Attending: PHYSICIAN ASSISTANT
Payer: MEDICARE

## 2019-04-01 DIAGNOSIS — I51.7: ICD-10-CM

## 2019-04-01 DIAGNOSIS — I37.1: Primary | ICD-10-CM

## 2019-04-01 PROCEDURE — 93306 TTE W/DOPPLER COMPLETE: CPT

## 2019-04-01 NOTE — CARD
MR#: [id number]

Account#: [id number]

Accession#: [id number]

Date of Study: 04/01/2019

Ordering Physician: ISRAEL MCCLURE, 

Referring Physician: ISRAEL MCCLURE, 

Tech: Verito Turner RDCS





--------------- APPROVED REPORT --------------





EXAM: Two-dimensional and M-mode echocardiogram with Doppler and color Doppler.



Other Information 

Quality : Fair



INDICATION

Systolic Murmur



2D DIMENSIONS 

RVDd2.6 (2.9-3.5cm)Left Atrium(2D)4.3 (1.6-4.0cm)

IVSd1.4 (0.7-1.1cm)Aortic Root(2D)3.3 (2.0-3.7cm)

LVDd4.6 (3.9-5.9cm)LVOT Diameter2.2 (1.8-2.4cm)

PWd1.4 (0.7-1.1cm)LVDs2.3 (2.5-4.0cm)

FS (%) 30.0 %SV77.8 ml

LVEF(%)60.0 (>50%)



Aortic Valve

AoV Peak Kalyan.221.3cm/sAoV VTI42.6cm

AO Peak GR.19.6mmHgLVOT Peak Kalyan.137.1cm/s

AO Mean GR.11mmHgAVA (VMAX)2.40cm2

TAMMY   (VTI)2.80cm2



Mitral Valve

MV E Szrykszv71.5cm/sMV DECEL AXNW027fq

MV A Xcgnksud262.6cm/sE/A  Ratio0.9



Tricuspid Valve

TR P. Vmyygmei177ow/sRAP DJYODMUG7idIv

TR Peak Gr.03knSdVQLO71ktLf



Pulmonary Vein

S1 Lhuunwkq05.5cm/sD2 Htxrlqqc80.4cm/s



 LEFT VENTRICLE 

The left ventricle is normal size. There is mild concentric left ventricular hypertrophy. The left ve
ntricular systolic function is normal. The Ejection Fraction is 60-65%. There is normal LV segmental 
wall motion. Transmitral Doppler flow pattern is Grade I-abnormal relaxation pattern.



 RIGHT VENTRICLE 

The right ventricle is normal size. The right ventricular systolic function is normal.



 ATRIA 

The left atrium is mildly dilated. The right atrium size is normal. The interatrial septum is intact 
with no evidence for an atrial septal defect or patent foramen ovale as noted on 2-D or Doppler imagi
ng.



 AORTIC VALVE 

The aortic valve is calcified but opens well. Doppler and Color Flow revealed no significant aortic r
egurgitation. There is no significant aortic valvular stenosis.



 MITRAL VALVE 

The mitral valve is normal in structure and function. There is no evidence of mitral valve prolapse. 
There is no mitral valve stenosis. Doppler and Color Flow revealed no mitral valve regurgitation note
d.



 TRICUSPID VALVE 

The tricuspid valve is normal in structure and function. Doppler and Color Flow revealed trace tricus
pid regurgitation. The PA pressure was estimated at 20 mmHg. There is no tricuspid valve stenosis.



 PULMONIC VALVE 

The pulmonic valve is not well visualized. Doppler and Color Flow revealed mild pulmonic valvular reg
urgitation. There is no pulmonic valvular stenosis.



 GREAT VESSELS 

The aortic root is normal in size. The ascending aorta is normal in size. The IVC is normal in size a
nd collapses >50% with inspiration.



 PERICARDIAL EFFUSION 

There is no evidence of significant pericardial effusion.



Critical Notification

Critical Value: No



<Conclusion>

The left ventricular systolic function is normal.

The Ejection Fraction is 60-65%.

There is normal LV segmental wall motion.

Transmitral Doppler flow pattern is Grade I-abnormal relaxation pattern.

Doppler and Color Flow revealed trace tricuspid regurgitation.

The PA pressure was estimated at 20 mmHg.

There is no evidence of significant pericardial effusion.



Signed by : Jorge L Albarado, 

Electronically Approved : 04/01/2019 12:02:52

## 2020-01-10 ENCOUNTER — HOSPITAL ENCOUNTER (OUTPATIENT)
Dept: HOSPITAL 61 - KCIC | Age: 75
Discharge: HOME | End: 2020-01-10
Attending: PHYSICIAN ASSISTANT
Payer: MEDICARE

## 2020-01-10 DIAGNOSIS — M25.461: ICD-10-CM

## 2020-01-10 DIAGNOSIS — M17.11: Primary | ICD-10-CM

## 2020-01-10 PROCEDURE — 73562 X-RAY EXAM OF KNEE 3: CPT

## 2020-01-10 NOTE — [facility]
Three-view right knee study

 

Clinical indications: Right knee pain.

 

FINDINGS: There is moderate to severe joint space narrowing and mild 

spurring of the medial tibiofemoral joint compartment. Mild joint space 

narrowing of the lateral tibiofemoral joint compartment is seen. 

Degenerative chondrocalcinosis of the medial and lateral menisci is seen. 

There is mild degenerative spurring of the patellofemoral joint 

compartment. No acute fracture or dislocation or lytic process is seen. 

Small right knee joint effusion is seen.

 

IMPRESSION: Tricompartmental primary degenerative osteoarthritis of the 

right knee most prominently involving the medial tibiofemoral joint 

compartment.

 

Electronically signed by: Angel Christianson MD (1/10/2020 5:09 PM) [id number]

## 2020-01-14 ENCOUNTER — HOSPITAL ENCOUNTER (OUTPATIENT)
Dept: HOSPITAL 61 - KCIC | Age: 75
Discharge: HOME | End: 2020-01-14
Attending: PHYSICIAN ASSISTANT
Payer: MEDICARE

## 2020-01-14 DIAGNOSIS — M48.061: Primary | ICD-10-CM

## 2020-01-14 DIAGNOSIS — M47.26: ICD-10-CM

## 2020-01-14 PROCEDURE — 72220 X-RAY EXAM SACRUM TAILBONE: CPT

## 2020-01-14 PROCEDURE — 72100 X-RAY EXAM L-S SPINE 2/3 VWS: CPT

## 2020-01-14 NOTE — [facility]
Examination: 3 views of lumbar spine, 2 views of the sacrum and coccyx 

 

history: Low back pain, radiculopathy.

 

COMPARISON: None available

 

FINDINGS:

 

Moderate intervertebral disc height loss identified in the lumbar spine 

likely degenerative changes. Osseous demineralization limits evaluation. 

Multilevel moderate bilateral neural foraminal narrowing identified. The 

alignment of the sacrum, coccyx grossly appears unremarkable.

 

IMPRESSION:

 

Degenerative changes lumbar spine. If pain persists consider MRI

 

Electronically signed by: Akin Domingo MD (1/14/2020 5:02 PM) [id number]

## 2020-01-14 NOTE — [facility]
Examination: 3 views of lumbar spine, 2 views of the sacrum and coccyx 

 

history: Low back pain, radiculopathy.

 

COMPARISON: None available

 

FINDINGS:

 

Moderate intervertebral disc height loss identified in the lumbar spine 

likely degenerative changes. Osseous demineralization limits evaluation. 

Multilevel moderate bilateral neural foraminal narrowing identified. The 

alignment of the sacrum, coccyx grossly appears unremarkable.

 

IMPRESSION:

 

Degenerative changes lumbar spine. If pain persists consider MRI

 

Electronically signed by: Akin Domingo MD (1/14/2020 5:02 PM) [id number]

## 2020-01-20 ENCOUNTER — HOSPITAL ENCOUNTER (OUTPATIENT)
Dept: HOSPITAL 61 - KCIC MRI | Age: 75
Discharge: HOME | End: 2020-01-20
Attending: FAMILY MEDICINE
Payer: MEDICARE

## 2020-01-20 DIAGNOSIS — M48.061: ICD-10-CM

## 2020-01-20 DIAGNOSIS — M51.17: Primary | ICD-10-CM

## 2020-01-20 DIAGNOSIS — M25.48: ICD-10-CM

## 2020-01-20 DIAGNOSIS — M51.16: ICD-10-CM

## 2020-01-20 PROCEDURE — 72148 MRI LUMBAR SPINE W/O DYE: CPT

## 2020-01-20 NOTE — [facility]
MRI of the lumbar spine without contrast 1/20/2020

 

CLINICAL HISTORY: Low back pain which radiates down the right leg.

 

TECHNIQUE: Unenhanced T1-weighted and T2-weighted sagittal and axial and 

inversion recovery sagittal images of the lumbar spine were obtained.

 

FINDINGS: Comparison is made to radiographs of the lumbar spine dated 

1/14/2020.

 

Minimal S-shaped curvature of the thoracolumbar spine is seen. 

Degenerative signal changes are seen involving all of the disks of the 

lumbar spine. Degenerative signal changes are seen within the marrow 

surrounding these discs. The conus medullaris is normal morphology, 

position, and signal characteristics. Rounded high signal intensity 

lesions are seen involving both kidneys on the T2-weighted images. These 

measure 3 mm to 3 cm in size. They likely represent cysts.

 

At the L1-2 disc space there is a mild to moderate generalized disc bulge.

This is eccentric to the left. Degenerative changes are seen involving the

facet joints bilaterally. There is moderate ligamentum flavum hypertrophy 

bilaterally. These findings when combined result in mild to moderate 

central spinal canal stenosis. No neural foraminal stenosis is seen.

 

At the L2-3 disc space there is a mild generalized disc bulge. 

Degenerative changes are seen involving the facet joints bilaterally. 

There is mild ligamentum flavum hypertrophy bilaterally. These findings 

when combined result in mild central spinal canal stenosis. No neural 

foraminal stenosis is seen.

 

At the L3-4 disc space there is a mild to moderate generalized disc bulge.

Degenerative changes are seen involving the facet joints bilaterally. 

There is mild ligamentum flavum hypertrophy bilaterally. There are small 

facet joint effusions bilaterally. These findings when combined result in 

mild central spinal canal stenosis. No neural foraminal stenosis is seen.

 

At the L4-5 disc space there is a mild generalized disc bulge. 

Superimposed on the disc bulge is a right lateral focal disc herniation. 

This extrudes superiorly. This measures 7 mm in AP diameter Degenerative 

changes are seen involving the facet joints bilaterally. There is moderate

ligamentum flavum hypertrophy bilaterally. These findings when combined 

result in mild central spinal canal stenosis. Moderate to severe right 

neural foraminal stenosis is seen. The disc herniation appears to impinge 

upon the right L4 nerve root within the right neural foramen. The left 

neural foramen is patent.

 

At the L5-S1 disc space there is a minimal generalized disc bulge. 

Degenerative changes are seen involving the facet joints bilaterally. 

These findings when combined do not result in significant central spinal 

canal or neural foraminal stenosis.

 

IMPRESSION: The changes of degenerative disc disease are seen throughout 

the lumbar spine. These findings result in mild to moderate central spinal

canal stenosis at L1-2 and mild central spinal canal stenosis at L2-3, 

L3-4 and L4-5. At the L4-5 disc space a right lateral focal disc 

herniation is seen which extrudes superiorly. This results in moderate to 

severe right neural foraminal stenosis and appears to impinge upon the 

right L4 nerve root within the right neural foramen.

 

Electronically signed by: Dustin Silva MD (1/20/2020 3:09 PM) [facility]

## 2020-03-18 ENCOUNTER — HOSPITAL ENCOUNTER (OUTPATIENT)
Dept: HOSPITAL 61 - PNCL | Age: 75
End: 2020-03-18
Attending: ANESTHESIOLOGY
Payer: MEDICARE

## 2020-03-18 DIAGNOSIS — Z98.42: ICD-10-CM

## 2020-03-18 DIAGNOSIS — Z87.39: ICD-10-CM

## 2020-03-18 DIAGNOSIS — Z72.89: ICD-10-CM

## 2020-03-18 DIAGNOSIS — M48.061: ICD-10-CM

## 2020-03-18 DIAGNOSIS — Z98.890: ICD-10-CM

## 2020-03-18 DIAGNOSIS — I10: ICD-10-CM

## 2020-03-18 DIAGNOSIS — Z96.1: ICD-10-CM

## 2020-03-18 DIAGNOSIS — M51.16: Primary | ICD-10-CM

## 2020-03-18 DIAGNOSIS — Z98.41: ICD-10-CM

## 2020-03-18 DIAGNOSIS — Z79.84: ICD-10-CM

## 2020-03-18 DIAGNOSIS — E11.9: ICD-10-CM

## 2020-03-18 PROCEDURE — 64483 NJX AA&/STRD TFRM EPI L/S 1: CPT

## 2020-03-18 NOTE — PAIN
DATE OF SERVICE:  03/18/2020



INITIAL CONSULTATION FOR PAIN CLINIC



CHIEF COMPLAINT:  Low back and right lower extremity pain.



HISTORY OF PRESENT ILLNESS:  This is a 74-year-old male, who presents with

history of pain for about 4 months, not the result of any specific injury or

accident he is aware of but has been increasing with pain in the low back and

right lower extremity radiating to posterolateral thigh, anterior thigh,

anterior medial thigh, and medial and lateral lower leg into the calf and ankle

on the right side.  The patient reports it is becoming more constant, sharp, and

intermittent in intensity but always present and changes during the day with

walking, standing, and worse with activity.  The patient reports it is

radiating, burning in the back and the right leg, mostly in the lateral and

anterior thigh.  The patient reports he does have a walker or a scooter he uses

occasionally but only if he is going to be travelling longer distances, does not

have these with him today.  The patient reports the pain is awakening him from

sleep at least once or twice a night, does not affect his bowel or bladder

control but does affect his ability to walk fairly significantly.  The patient

had a trigger point injection in his primary care's office which was not helpful

in the right posterior hip.  He has been taking hydrocodone, which does decrease

the pain to a moderate extent.  He is doing some stretching and strengthening on

his own.  No formal physical therapies at this point or other chiropractic

treatments or other modalities.  No significant pain noted on the left side. 

The patient did have MRI scan of the lumbar spine showing at L4-L5 mild

generalized disk bulge with a superimposed right lateral focal disk herniation

extruding superiorly measuring 7 mm in AP diameter with moderate ligamentum

flavum hypertrophy making mild central spinal canal stenosis, moderate-to-severe

right neural foraminal stenosis seen with disk herniation appearing to impinge

upon the right L4 nerve root within the right neural foramen.  The patient rates

his disability rating from 0-10, 10 being the worst, 3 with family and home

responsibilities, 6 with recreation, 5 with social activity, 9 with occupation,

7 with sexual behavior, 2 with self-care, and 1 with life support activities.



PAST MEDICAL HISTORY:  Significant for hearing loss, type 2 diabetes, melanoma,

hypertension, dizziness, and arthritis.



PAST SURGICAL HISTORY:  Previous surgeries include cataract extraction

bilaterally and right thigh abscess incision and drainage.



CURRENT MEDICATIONS:  Include atorvastatin, amlodipine, hydrochlorothiazide,

pioglitazone, metformin, vitamin B12, and allopurinol.



ALLERGIES:  The patient has no known drug allergies.



FAMILY HISTORY:  Significant for hypertension and hypercholesterolemia.



SOCIAL HISTORY:  The patient drinks about 1 beer a week or less, does not smoke,

denies any illegal, illicit, or recreational drugs.  He is , lives with

his spouse, and lives locally in [place], [place]; reports he is currently

retired.



REVIEW OF SYSTEMS:  The patient's review of systems is positive for those items

mentioned in history of present illness.  All systems reviewed and otherwise

negative.  It is complete, full, and well documented on the patient's chart.



PHYSICAL EXAMINATION:

VITAL SIGNS:  The patient's blood pressure is 176/89, pulse is 77, respirations

16, temperature 98.1 degree Fahrenheit, height is 5 feet 10 inches, and weight

is 306 pounds.

GENERAL:  The patient is awake, alert, oriented, appropriate, very pleasant

demeanor.

HEENT:  Shows normocephalic, atraumatic.  Extraocular movements are intact and

symmetrical.  Oral cavity:  Mucous membranes are moist and pink.  Dentition is

intact.

NECK:  Shows anterior throat supple without palpable lymphadenopathy noted. 

Swallow reflex is symmetrical.

CHEST:  Shows normal on inspection.  Breath sounds are clear to auscultation

bilaterally.

HEART:  Shows S1, S2 clear.  No murmurs are auscultated.

ABDOMEN:  Obese, soft, nontender, nondistended.  No palpable organomegaly is

noted.  No rebound or guarding demonstrated.

BACK:  Shows spine grossly in the midline, slightly raised thoracic kyphosis,

normal cervical lordotic curvature and lumbar lordotic curvature.  Lumbar

paraspinous muscle shows symmetrical on inspection; on palpation, he has some

moderate tenderness diffusely bilaterally going diffusely without significant

radiation.  The patient's back shows good rotational motion both laterally

greater than 10 degrees right and left as well as extension greater than 10

degrees, forward flexion 45 degrees without significant pain reported.  The

patient shows no tenderness over his spinous processes, sacrum, or sacroiliac

regions with direct palpation.

EXTREMITIES:  The patient's lower extremities show deep tendon reflexes at 1+ in

the patellae and tendo-calcaneus tendons equal.  Motor examination is strong

with 5/5 dorsiflexion and extension on the left but 4/5 on the right with ankle

extension and flexion.  Quadriceps and hamstring flexion are 5/5 bilaterally. 

Peripheral pulses are 1+ posterior tibia.  No peripheral edema is noted

bilaterally.  Lower extremities are warm and dry to touch, equal in color and

appearance.  Seated straight leg raise is noted to be negative for reproduction

of radicular symptoms in both right and left lower extremity.  Gaenslen and

Lucho maneuvers are negative bilaterally as well.  The patient is able to

stand, stand on his toes, without significant difficulty or loss of balance;

walks with favoring gait.  He is favoring the right lower extremity fairly

significantly with ambulating but not using any assistive devices that he has

with him in the clinic today.

SKIN:  Shows warm and dry, good turgor.  No edema.  No sores, rashes, or

bruising with exception of some skin grafting on the nose and forehead.



IMPRESSION:

1.  This is a 74-year-old male with approximately a 4-month history of low back

and right lower extremity pain in a radicular fashion.

2.  MRI scan of lumbar spine as noted.

3.  Type 2 diabetes.

4.  Arthritis.

5.  Hypertension.



PLAN:  Options were discussed with the patient including conservative medical

management, physical therapies, and interventional techniques.  We discussed a

right-sided L4-L5 transforaminal injection using description as well as

anatomical models to describe the procedure.  Risks were then discussed

including but not limited to bleeding, infection, possibility of epidural

hematoma, subsequent neurological compromise, dural puncture, headaches, spinal

cord and/or nerve damage, side effects of steroid medication, possible injection

into the vertebral artery at that level, and permanent ischemic damage as well

as poor results regarding pain control.  The patient understands and wished to

proceed.  The patient will return to the clinic in approximately 2 weeks for

followup.  He was counseled as to return appointment, activity level, and side

effects to be aware of.



DIAGNOSES:  Lumbar radiculopathy with lumbar degenerative disk disease, lumbar

spinal stenosis, and lumbar herniated disk.



PROCEDURE:  Lumbar right-sided L4-L5 transforaminal injection using C-arm

fluoroscopic guidance under sterile prep and drape using local anesthetic.



MEDICATION INJECTED:  A total of 2 mL of 0.25% bupivacaine and 80 mg Depo-Medrol

with negative uptake of contrast on digital subtraction, good spread medially

into the epidural space as well as laterally along the nerve root.



CONDITION AT DISCHARGE:  Stable.  The patient tolerated procedure well and had

no complications.  The patient was discharged under his own power.

 



______________________________

JIM ROLON MD



DR:  BLAIR/kaity  JOB#:  [id number] / [id number]

DD:  03/18/2020 12:35  DT:  03/18/2020 13:49

## 2020-07-02 ENCOUNTER — HOSPITAL ENCOUNTER (OUTPATIENT)
Dept: HOSPITAL 61 - KCIC CT | Age: 75
End: 2020-07-02
Attending: PHYSICIAN ASSISTANT
Payer: MEDICARE

## 2020-07-02 DIAGNOSIS — R60.0: ICD-10-CM

## 2020-07-02 DIAGNOSIS — I73.9: ICD-10-CM

## 2020-07-02 DIAGNOSIS — N18.9: ICD-10-CM

## 2020-07-02 DIAGNOSIS — I12.9: ICD-10-CM

## 2020-07-02 DIAGNOSIS — N28.1: ICD-10-CM

## 2020-07-02 DIAGNOSIS — I83.93: Primary | ICD-10-CM

## 2020-07-02 PROCEDURE — 82565 ASSAY OF CREATININE: CPT

## 2020-07-02 PROCEDURE — 73706 CT ANGIO LWR EXTR W/O&W/DYE: CPT

## 2020-07-02 NOTE — [facility]
Examination: CT ANGIO LOWER EXTREMITY BILAT

 

History: Reason: BILATERAL LOWER EXTREMITY EDEMA, CLAUDICATION / Spl. 

Instructions: OMNI 300 100ML / History: 

 

Comparison/Correlation: None

 

Findings: Axial images were obtained following IV contrast and compared to

arteriography protocol from the level of the infrarenal abdominal aorta to

the distal metatarsal bones. Examination is limited due to suboptimal 

opacification of contrast at the distal external iliac arteries 

bilaterally and more distally to the feet. Evaluation at the calf levels 

is in particular limited. Sagittal and coronal reformatted images were 

provided. 3-D, MIPS images were provided.

 

Incidental note is made of bilateral renal cysts. Possibility of a mass 

lesion however is raised on the very first image of series 4 at the 

lateral aspect of the left kidney anteriorly. It is not fully included for

this exam.

 

Abdominal aorta and iliac arteries are normal with no significant 

atheromatous involvement. No distal infrarenal abdominal aortic aneurysm. 

Main renal arteries were not included in this exam. There does appear to 

be an accessory left renal artery which is only partially seen. Inferior 

mesenteric artery opacifies normally with contrast.

 

Common femoral arteries are widely patent. Superficial femoral arteries 

bilaterally are probably patent although evaluation is somewhat limited. 

Profunda femoris artery bilaterally is unremarkable. Popliteal arteries 

bilaterally appear unremarkable. Evaluation of the tibialis anterior, 

peroneal, posterior tibial, and tibioperoneal trunks bilaterally as fairly

limited. Opacification of these vessels is seen. Dorsalis pedis appears 

opacified with contrast well. Extensive subcutaneous edema is noted 

involving the calves bilaterally. Subcutaneous edema is not seen involving

the thighs. There is opacification of varicose veins and other 

subcutaneous veins bilaterally involving the calves.

 

No suspicious bony process identified. Hip joints are symmetric. Narrowing

of the medial compartments bilaterally seen. Tibiotalar joints are 

unremarkable.

 

 

Impression:

No significant stenosis is identified involving the arterial vasculature 

from the distal infrarenal abdominal aortic level to the common femoral 

arterial levels. Evaluation more distally however is limited especially in

the region of the calves. No definite stenosis is seen on this limited 

exam. Marked subcutaneous edema involving the calves bilaterally is noted.

 

Possibility of a mass lesion is questioned along the left kidney 

anterolaterally. It is not fully included for purposes of this exam. 

Consider further evaluation with CT of the kidneys without and with 

contrast.

 

 

PQRS Compliance Statement:

 

One or more of the following individualized dose reduction techniques were

utilized for this examination:  

1. Automated exposure control  

2. Adjustment of the mA and/or kV according to patient size  

3. Use of iterative reconstruction technique

 

Electronically signed by: Uriel Rolle MD (7/2/2020 5:06 PM) [id number]

## 2020-08-06 ENCOUNTER — HOSPITAL ENCOUNTER (OUTPATIENT)
Dept: HOSPITAL 61 - KCIC US | Age: 75
End: 2020-08-06
Attending: PHYSICIAN ASSISTANT
Payer: MEDICARE

## 2020-08-06 DIAGNOSIS — N28.1: Primary | ICD-10-CM

## 2020-08-06 PROCEDURE — 76770 US EXAM ABDO BACK WALL COMP: CPT

## 2020-08-06 NOTE — [facility]
Bilateral renal ultrasound without comparison for right middle cyst, left 

kidney mass, abnormal MRI of the lumbar spine in January.

 

TECHNIQUE AND FINDINGS: Real-time grayscale and color Doppler evaluation 

of the kidneys and urinary bladder is performed. The urinary bladder is 

fluid distended and grossly unremarkable. The right kidney measures 11.4 x

4.6 x 5.4 cm and the left measures 13.4 x 6.4 x 7 cm. There is no 

hydronephrosis or perinephric fluid around either kidney. There is a 

single simple appearing 3 cm exophytic renal cyst on the right and there 

are at least 4 small simple appearing cysts on the left, largest which 

measures 2.6 cm. No further follow-up of any be simple cysts is 

recommended. No suspicious renal masses are evident.

 

IMPRESSION:

1. Multiple bilateral simple renal cysts for which no additional follow-up

is recommended.

 

Electronically signed by: Shahzad Monzon MD (8/6/2020 3:47 PM) [id number]

## 2021-01-08 ENCOUNTER — HOSPITAL ENCOUNTER (OUTPATIENT)
Dept: HOSPITAL 61 - US | Age: 76
End: 2021-01-08
Attending: INTERNAL MEDICINE
Payer: MEDICARE

## 2021-01-08 DIAGNOSIS — R60.0: Primary | ICD-10-CM

## 2021-01-08 DIAGNOSIS — I10: ICD-10-CM

## 2021-01-08 PROCEDURE — 93306 TTE W/DOPPLER COMPLETE: CPT

## 2021-01-08 PROCEDURE — 93970 EXTREMITY STUDY: CPT

## 2021-01-08 NOTE — RAD
EXAM: Bilateral lower extremity venous reflux exam.



HISTORY: Pain.



TECHNIQUE: Sonographic imaging of the lower extremity veins was performed.



COMPARISON: None.



FINDINGS: The right greater saphenous vein measures 6.5 mm in caliber at the saphenofemoral junction 
and 7.5 mm in caliber within the proximal thigh the left greater saphenous vein measures 8.6 mm calib
er at the saphenofemoral junction and 6.7 mm caliber in the proximal thigh. There is no evidence of g
reater saphenous vein reflux.



The right lesser saphenous vein measures 4.5 mm in caliber and there is 0.4 seconds of reflux at the 
level of the knee. This vessel is not seen within the calf and the left lesser saphenous vein is not 
seen due to small caliber or soft tissue edema. There is an incidental proximal right inguinal lymph 
node which demonstrates a benign fatty hilum and is likely physiologic or reactive.



IMPRESSION:

1. Limited exam due to soft tissue edema. The left lesser saphenous vein is not seen in the right les
ser saphenous vein is not well seen. There is reflux within the right lesser saphenous vein described
 above.

2. Bilateral greater saphenous vein caliber measurements. There is no evidence of greater saphenous v
ein reflux.



Electronically signed by: Leidy Anne MD (1/8/2021 1:24 PM) [id number]

## 2021-01-14 NOTE — CARD
MR#: [id number]

Account#: [id number]

Accession#: [id number]

Date of Study: 01/08/2021

Ordering Physician: KYLIE DAY, 

Referring Physician: KYLIE DAY 

Tech: Susan Santos [facility]





--------------- APPROVED REPORT --------------





EXAM: Two-dimensional and M-mode echocardiogram with Doppler and color Doppler.



Other Information 

Quality : AverageHR: 92bpm

Rhythm : NSRTechnically limited study due to  body habitus.



INDICATION

Chest Pain 



RISK FACTORS

Hypertension 

Hyperlipidemia



2D DIMENSIONS 

RVDd2.8 (2.9-3.5cm)Left Atrium(2D)4.0 (1.6-4.0cm)

IVSd1.2 (0.7-1.1cm)Aortic Root(2D)3.3 (2.0-3.7cm)

LVDd4.4 (3.9-5.9cm)LVOT Diameter2.5 (1.8-2.4cm)

PWd1.3 (0.7-1.1cm)LVDs2.1 (2.5-4.0cm)

FS (%) 53.4 %SV75.6 ml

LVEF(%)84.5 (>50%)



Aortic Valve

AoV Peak Kalyan.215.6cm/sAoV VTI34.7cm

AO Peak GR.18.6mmHgLVOT Peak Kalyan.159.0cm/s

AO Mean GR.8mmHgAVA (VMAX)3.76cm2



Mitral Valve

MV E Wigkrrqc104.0cm/sMV DECEL [id number]

MV A Ffxhjcyz70.9cm/sE/A  Ratio1.1



Pulmonary Valve

PV Peak Hsdroobu887.9cm/s



Pulmonary Vein

S1 Ykcovtyw15.0cm/sD2 Huordzdo07.2cm/s

PVa [id number]



 LEFT VENTRICLE 

The left ventricle is normal size. There is mild concentric left ventricular hypertrophy. The left ve
ntricular systolic function is normal and the ejection fraction is within normal range. Estimated eje
ction fraction 60-65%. There is normal LV segmental wall motion. Tissue Doppler imaging reveals mild 
left ventricular diastolic dysfunction.



 RIGHT VENTRICLE 

The right ventricle is normal size. The right ventricle is mildly hypertrophied. The right ventricula
r systolic function is normal.



 ATRIA 

The left atrium is mildly dilated. The right atrium is mildly dilated. The interatrial septum is inta
ct with no evidence for an atrial septal defect or patent foramen ovale as noted on 2-D or Doppler im
aging.



 AORTIC VALVE 

The aortic valve is sclerotic with decreased leaflet motion. Doppler and Color Flow revealed no signi
ficant aortic regurgitation. There is no significant aortic valvular stenosis. There is no aortic shy
vular vegetation.



 MITRAL VALVE 

The mitral valve is normal in structure and function. There is no evidence of mitral valve prolapse. 
There is no mitral valve stenosis. Doppler and Color Flow revealed no mitral valve regurgitation note
d.



 TRICUSPID VALVE 

The tricuspid valve is normal in structure and function. Doppler and Color Flow revealed no tricuspid
 valve regurgitation noted. There is no tricuspid valve stenosis. 



 PULMONIC VALVE 

Doppler and Color Flow revealed no pulmonic valvular regurgitation. There is no pulmonic valvular renée
nosis.



 GREAT VESSELS 

The aortic root is normal in size. The IVC is normal in size and collapses >50% with inspiration.



 PERICARDIAL EFFUSION 

There is no evidence of significant pericardial effusion.



Critical Notification

Critical Value: No



<Conclusion>

The left ventricular systolic function is normal and the ejection fraction is within normal range.  E
stimated ejection fraction 60-65%.

There is normal LV segmental wall motion.



Signed by : Harley Alberto, 

Electronically Approved : 01/14/2021 16:25:25

## 2021-06-25 ENCOUNTER — HOSPITAL ENCOUNTER (OUTPATIENT)
Dept: HOSPITAL 61 - PNCL | Age: 76
Discharge: HOME | End: 2021-06-25
Attending: ANESTHESIOLOGY
Payer: MEDICARE

## 2021-06-25 DIAGNOSIS — J45.909: ICD-10-CM

## 2021-06-25 DIAGNOSIS — Z87.891: ICD-10-CM

## 2021-06-25 DIAGNOSIS — E11.9: ICD-10-CM

## 2021-06-25 DIAGNOSIS — I10: ICD-10-CM

## 2021-06-25 DIAGNOSIS — M48.061: ICD-10-CM

## 2021-06-25 DIAGNOSIS — Z79.899: ICD-10-CM

## 2021-06-25 DIAGNOSIS — Z79.84: ICD-10-CM

## 2021-06-25 DIAGNOSIS — Z98.890: ICD-10-CM

## 2021-06-25 DIAGNOSIS — E78.00: ICD-10-CM

## 2021-06-25 DIAGNOSIS — M51.16: Primary | ICD-10-CM

## 2021-06-25 DIAGNOSIS — Z72.89: ICD-10-CM

## 2021-06-25 PROCEDURE — 64483 NJX AA&/STRD TFRM EPI L/S 1: CPT

## 2021-06-25 NOTE — PDOC
Progress Note - Pain Clinic


Date of Service:


DOS:


DATE: 6/25/21 


TIME: 10:42





Diagnosis:


Dx:


Lumbar radiculopathy with lumbar degenerative disc disease lumbar spinal 

stenosis and lumbar herniated disc





History or Present Illness:


HPI:


75-year-old male returns for follow-up last seen March 18, 2020 patient 

underwent right-sided L4-5 transforaminal injection with about 95% improvement 

until the last month or so the pain began to return without any new injury or 

accident that he is aware of radiating the right lower extremity posterior 

gluteus lateral thigh anterior thigh medial thigh medial lower leg and lateral 

lower leg on the right side worse with walking standing changing positions 

better with sitting or laying down patient reports he been sleeping in a 

recliner which does help decrease the pain as well sitting in a recliner.  

Patient reports otherwise is a constant pain severe shooting stabbing cramping 

tight and radiating patient reports a 10 on scale 10 is worse over the past week

9 on average for its least is a 9 today.  Patient reports no new motor or 

sensory deficits no bowel or bladder incontinence initially was doing much 

better doing all activities distance walking possible activities work activities

travel with greater ease and comfort and sleeping better now the pain has 

returned.





Physical Exam:


VS:


Blood pressure is 141/87 pulse 79 respirations 18 temperature 97.8 F height is 

5 feet 10 inches weight is 296 pounds


PE:


PHYSICAL EXAMINATION:





GENERAL: The patient is awake, alert, oriented, appropriate, very pleasant 

demeanor


HEENT: Shows normocephalic, atraumatic.  Extraocular movements are intact and 

symmetrical.  Oral cavity: Mucous membranes moist and pink. 


NECK: Shows anterior throat supple without palpable lymphadenopathy noted.  

Swallow reflex symmetrical.


CHEST: Shows normal on inspection.  Breath sounds are clear bilaterally, distant

but no rales rhonchi or wheezes auscultated.


HEART: Shows S1, S2 clear.  No murmurs auscultated.


ABDOMEN: Soft, nontender, nondistended, obese.  No palpable organomegaly is 

noted. 


BACK: Shows spine grossly in the midline.  Normal-appearing cervical lordotic 

curvature.  There is slightly increased thoracic kyphosis, some minor flattening

of the lumbar lordotic curvature.  Lumbar paraspinous muscles show symmetrical 

on inspection, on palpation shows some moderate tenderness diffusely throughout 

the upper, middle and lower distribution of the paraspinous muscles, but without

specific trigger points, without radiation of pain.  The patient has good 

rotational motion of the lumbar spine, both laterally as well as extension and 

flexion without significant difficulty.  No tenderness over the spinous 

processes, sacrum or sacroiliac regions.


EXTREMITIES: Lower extremities show deep tendon reflexes 1+ in the patellar and 

tendo calcaneus tendons.  Motor exam is 4 on a scale of 5 with right 

dorsiflexion, extension, quadriceps and hamstring flexion and 5/5 on the left.  

Peripheral pulses are 1+ posterior tibial.  No peripheral edema is noted 

bilaterally.  Lower extremities are warm and dry to touch, equal in color and 

appearance. 


SKIN: Shows warm and dry, good turgor.  No edema.  No sores, rashes or bruising 

throughout.





Procedure:


Procedure:


Options were discussed with the patient.  Patient's old chart reviewed his 

current medication regimen updated current review of systems updated today as 

well.  We will proceed with a right L4-5 transforaminal epidural steroid 

injection today with fluoroscopic guidance.  Risks were discussed including but 

not limited to: Bleeding, infection, possibility of epidural hematoma and 

subsequent neurological compromise, dural puncture, headaches, spinal cord 

and/or nerve damage, potential injection to the vertebral artery at that level 

and permanent ischemic damage, side effects of steroid medication, and poor 

results regarding pain control.  Patient understands and wished to proceed.  

Patient will return to the clinic in approximate 2 weeks for follow-up, was 

counseled as to return appointment activity level and side effects to be aware 

of.





Medication Injected:


Med Injected:


Under sterile prep and drape patient was placed in prone position using C-arm 

fluoroscopic guidance to identify the L4-5 distribution oblique and slightly 

cephalad angled C arm.  The right L4-5 target was identified and using lidocaine

for anesthetizing the skin 22-gauge Mone pencil point needle was then used 

to enter the skin and into the subcutaneous tissues using direct C-arm 

fluoroscopic guidance to guide the needle into the transforaminal aspect of the 

l right L4-5 vertebrae this was confirmed with lateral views showing the needle 

tip in the superior aspect of the paravertebral region.  Aspiration was noted to

be negative, -1.5 cc of contrast was then injected with good spread both 

medially into the epidural space as well as laterally along the nerve root 

without uptake and without distribution and uptake on digital subtraction.  At 

this time, a solution containing 2 cc of 0.25% bupivacaine and 80 mg of Depo-

Medrol was then injected.  Needle was withdrawn and sterile bandage was applied.

 Patient tolerated procedure well had no immediate complications





Condition at Discharge:


Condition at Discharge:


Condition at discharge stable, patient already procedure well and had no 

complications.











JIM ROLON MD               Jun 25, 2021 10:45

## 2021-06-25 NOTE — PDOC4
Procedure Note:


Procedure Note:


Patient was consented for right L4-5 transforaminal epidural steroid injection. 

Risks were discussed including but not limited to: Bleeding, infection, 

possibility of epidural hematoma and subsequent neurological compromise, dural 

puncture, headaches, spinal cord and/or nerve damage, potential injection into 

the vertebral artery at that level and permanent ischemic damage, side effects 

of steroid medication, and poor results regarding pain control.  Patient 

understands and wished to proceed.


Under sterile prep and drape patient was placed in prone position using C-arm 

fluoroscopic guidance to identify the L4-5 distribution oblique and slightly 

cephalad angled C arm.  The right L4-5 target was identified and using lidocaine

for anesthetizing the skin 22-gauge Mone pencil point needle was then used 

to enter the skin and into the subcutaneous tissues using direct C-arm 

fluoroscopic guidance to guide the needle into the transforaminal aspect of the 

l right L4-5 vertebrae this was confirmed with lateral views showing the needle 

tip in the superior aspect of the paravertebral region.  Aspiration was noted to

be negative, -1.5 cc of contrast was then injected with good spread both 

medially into the epidural space as well as laterally along the nerve root 

without uptake and without distribution and uptake on digital subtraction.  At 

this time, a solution containing 2 cc of 0.25% bupivacaine and 80 mg of Depo-

Medrol was then injected.  Needle was withdrawn and sterile bandage was applied.

 Patient tolerated procedure well had no immediate complications











JIM ROLON MD               Jun 25, 2021 10:46

## 2022-02-01 ENCOUNTER — HOSPITAL ENCOUNTER (OUTPATIENT)
Dept: HOSPITAL 61 - ECHO | Age: 77
End: 2022-02-01
Attending: INTERNAL MEDICINE
Payer: MEDICARE

## 2022-02-01 DIAGNOSIS — I50.32: ICD-10-CM

## 2022-02-01 DIAGNOSIS — I08.0: Primary | ICD-10-CM

## 2022-02-01 PROCEDURE — 93306 TTE W/DOPPLER COMPLETE: CPT

## 2022-02-01 PROCEDURE — C8929 TTE W OR WO FOL WCON,DOPPLER: HCPCS

## 2022-02-01 NOTE — CARD
MR#: [id number]

Account#: [id number]

Accession#: [id number]

Date of Study: 02/01/2022

Ordering Physician: KYLIE ALBARADO, 

Referring Physician: Shakeel ESCOTO: [facility]





--------------- APPROVED REPORT --------------





EXAM: Two-dimensional and M-mode echocardiogram with Doppler and color Doppler.



Other Information 

Quality : FairHR: 81bpm

Rhythm : NSR



INDICATION





RISK FACTORS

Hypertension 

Obesity   

Family History 



2D DIMENSIONS 

Left Atrium(2D)4.5 (1.6-4.0cm)IVSd1.3 (0.7-1.1cm)

Aortic Root(2D)3.6 (2.0-3.7cm)LVDd5.2 (3.9-5.9cm)

LVOT Diameter2.0 (1.8-2.4cm)PWd1.3 (0.7-1.1cm)

LVDs2.9 (2.5-4.0cm)FS (%) 44.2 %

SV97.0 mlLVEF(%)75.1 (>50%)



Aortic Valve

AoV Peak Kalyan.196.2cm/sAoV VTI38.8cm

AO Peak GR.15.4mmHgLVOT Peak Kalyan.135.9cm/s

LVOT  VTI 28.37cmAO Mean GR.9mmHg

TAMMY (VMAX)1.67ls0ZQZ   (VTI)2.23cm2



Mitral Valve

MV E Okmflxuu89.1cm/sMV DECEL [id number]

MV A Ynbaknbq29.5cm/sMV [id number]

E/A  Ratio0.9MVA (PHT)2.92cm2



TDI

E/Lateral E'13.2E/Medial E'9.7



Pulmonary Valve

PV Peak Xreporgs734.3cm/sPV Peak Grad.5mmHg



Tricuspid Valve

TR P. Xcqloyfq507gr/sTR Peak Gr.28mmHg



Pulmonary Vein

S1 Gpgtitpw43.4cm/sD2 Vsylfyjd82.7cm/s



 LEFT VENTRICLE 

The left ventricle is normal size. There is mild concentric left ventricular hypertrophy. The left ve
ntricular systolic function is normal. The ejection fraction is 60-65%. There is normal LV segmental 
wall motion. Transmitral Doppler flow pattern is Grade I-abnormal relaxation pattern. No left ventric
le thrombus noted on this study. There is no ventricular septal defect visualized. There is no left v
entricular aneurysm. There is no mass noted in the left ventricle.



 RIGHT VENTRICLE 

The right ventricle is normal size. There is normal right ventricular wall thickness. The right ventr
icular systolic function is normal.



 ATRIA 

The left atrium is mildly dilated. The right atrium is mildly dilated. The interatrial septum is inta
ct with no evidence for an atrial septal defect or patent foramen ovale as noted on 2-D or Doppler im
aging.



 AORTIC VALVE 

The aortic valve is calcified but opens well. Doppler and Color Flow revealed no significant aortic r
egurgitation. There is no significant aortic valvular stenosis. Calculated aortic valve area is 2.3 c
m2 with maximum pressure gradient of 16 mmHg and mean pressure gradient of 9 mmHg. There is no aortic
 valvular vegetation.



 MITRAL VALVE 

The mitral valve is thickened but opens well. There is no evidence of mitral valve prolapse. There is
 no mitral valve stenosis. Doppler and Color-flow revealed trace mitral regurgitation.



 TRICUSPID VALVE 

The tricuspid valve is normal in structure and function. Doppler and Color Flow revealed trace tricus
pid regurgitation. There is no tricuspid valve prolapse or vegetation. There is no tricuspid valve st
enosis.



 PULMONIC VALVE 

The pulmonary valve is normal in structure and function. Doppler and Color Flow revealed no pulmonic 
valvular regurgitation. There is no pulmonic valvular stenosis.



 GREAT VESSELS 

The aortic root is normal in size. The ascending aorta is normal in size. The pulmonary artery is nor
mal. The IVC is normal in size and collapses >50% with inspiration.



 PERICARDIAL EFFUSION 

There is no pleural effusion. There is no evidence of significant pericardial effusion.



Critical Notification

Critical Value: No



<Conclusion>

The left ventricular systolic function is normal.

The ejection fraction is 60-65%.

There is normal LV segmental wall motion.

Transmitral Doppler flow pattern is Grade I-abnormal relaxation pattern.

Trace mitral regurgitation.

Trace tricuspid regurgitation.

There is no evidence of significant pericardial effusion.



Signed by : Kylie Albarado, 

Electronically Approved : 02/01/2022 12:16:54